# Patient Record
Sex: MALE | Race: BLACK OR AFRICAN AMERICAN | NOT HISPANIC OR LATINO
[De-identification: names, ages, dates, MRNs, and addresses within clinical notes are randomized per-mention and may not be internally consistent; named-entity substitution may affect disease eponyms.]

---

## 2022-10-20 PROBLEM — Z98.890 HISTORY OF ELBOW SURGERY: Status: RESOLVED | Noted: 2022-10-20 | Resolved: 2022-10-20

## 2022-10-20 PROBLEM — Z00.00 ENCOUNTER FOR PREVENTIVE HEALTH EXAMINATION: Status: ACTIVE | Noted: 2022-10-20

## 2022-10-20 PROBLEM — Z87.891 FORMER SMOKER: Status: ACTIVE | Noted: 2022-10-20

## 2022-10-20 RX ORDER — TIZANIDINE HYDROCHLORIDE 6 MG/1
CAPSULE ORAL
Refills: 0 | Status: ACTIVE | COMMUNITY

## 2022-10-20 RX ORDER — IBUPROFEN 800 MG/1
TABLET, FILM COATED ORAL
Refills: 0 | Status: ACTIVE | COMMUNITY

## 2022-10-24 ENCOUNTER — APPOINTMENT (OUTPATIENT)
Dept: NEUROSURGERY | Facility: CLINIC | Age: 52
End: 2022-10-24

## 2022-10-24 ENCOUNTER — NON-APPOINTMENT (OUTPATIENT)
Age: 52
End: 2022-10-24

## 2022-10-24 VITALS
WEIGHT: 194 LBS | OXYGEN SATURATION: 98 % | RESPIRATION RATE: 17 BRPM | BODY MASS INDEX: 30.45 KG/M2 | TEMPERATURE: 97.1 F | HEART RATE: 67 BPM | HEIGHT: 67 IN

## 2022-10-24 VITALS
BODY MASS INDEX: 21.62 KG/M2 | SYSTOLIC BLOOD PRESSURE: 88 MMHG | TEMPERATURE: 96.2 F | RESPIRATION RATE: 17 BRPM | HEIGHT: 69 IN | WEIGHT: 146 LBS | HEART RATE: 90 BPM | DIASTOLIC BLOOD PRESSURE: 63 MMHG | OXYGEN SATURATION: 99 %

## 2022-10-24 DIAGNOSIS — Z98.890 OTHER SPECIFIED POSTPROCEDURAL STATES: ICD-10-CM

## 2022-10-24 DIAGNOSIS — Z87.891 PERSONAL HISTORY OF NICOTINE DEPENDENCE: ICD-10-CM

## 2022-10-24 PROCEDURE — 99204 OFFICE O/P NEW MOD 45 MIN: CPT

## 2022-10-25 RX ORDER — EMTRICITABINE, RILPIVIRINE HYDROCHLORIDE, AND TENOFOVIR ALAFENAMIDE 200; 25; 25 MG/1; MG/1; MG/1
TABLET ORAL
Refills: 0 | Status: ACTIVE | COMMUNITY

## 2022-10-30 NOTE — HISTORY OF PRESENT ILLNESS
[de-identified] : 52 year old man with past medical history HIV who presents to the office today for second opinion for lumbar spine.\par \par Mr. Cosme states he has suffered from low back pain and sciatica for the past 3 years. He states it has become progressively worse over the past year. He reports severe low back pain radiating to bilateral hips and buttocks down legs to feet with associated numbness/tingling (RIGHT greater than LEFT). He reports subjective weakness of bilateral lower extremities. He has been under the care of pain management for the past 3 years and has had ESIs in the past. At first, he states he had relief from the ESIs for about 4-6 weeks but repeat ESIs are now ineffective. He has also failed oral pain medications prescribed by pain management.\par \par He has participated in supervised physical therapy with no improvement in symptoms.\par \par He had MRI lumbar spine done on 9/15/22 which he brings today for review.

## 2022-10-30 NOTE — ASSESSMENT
[FreeTextEntry1] : MRI lumbar spine without contrast done on 9/15/22 reviewed by Dr. Contreras with patient, demonstrates L3-4 severe left and moderate right foraminal stenosis, L4-5 severe left and right foraminal stenosis and L5-S1 disc herniation.\par The patient has exhausted conservative measures and failed supervised physical therapy and pain management.\par We discussed potential surgical interventions to address his symptoms including instrumented fusion.\par Prior to finalizing surgical planning, CT lumbar spine and flex/ext x-rays needed.\par Also recommend EMG of lower extremities to determine specific neurological levels of involvement to guide further treatment accordingly\par \par After ordered imaging studies complete, return to the office to review imaging with Dr. Contreras and discuss surgical final surgical plan.\par \par Patient verbalizes agreement and understanding with plan of care.\par \par I, Dr. Contreras, personally performed the evaluation and management (E/M) services for this new patient.  That E/M includes conducting the initial examination, assessing all conditions, and establishing the plan of care.  Today, my ACP, Anum Reed, was here to observe my evaluation and management services for this patient to be followed going forward.\par \par

## 2022-10-30 NOTE — PHYSICAL EXAM
[General Appearance - Alert] : alert [General Appearance - In No Acute Distress] : in no acute distress [Oriented To Time, Place, And Person] : oriented to person, place, and time [Straight-Leg Raise Test - Left] : straight leg raise of the left leg was positive [Straight-Leg Raise Test - Right] : straight leg raise of the right leg was positive [Pain] : was painful [Antalgic] : antalgic [Sclera] : the sclera and conjunctiva were normal [Outer Ear] : the ears and nose were normal in appearance [Neck Appearance] : the appearance of the neck was normal [] : no respiratory distress [Abnormal Walk] : normal gait [Able to toe walk] : the patient was not able to toe walk

## 2022-11-14 ENCOUNTER — TRANSCRIPTION ENCOUNTER (OUTPATIENT)
Age: 52
End: 2022-11-14

## 2022-11-18 ENCOUNTER — NON-APPOINTMENT (OUTPATIENT)
Age: 52
End: 2022-11-18

## 2022-11-18 ENCOUNTER — APPOINTMENT (OUTPATIENT)
Dept: NEUROSURGERY | Facility: CLINIC | Age: 52
End: 2022-11-18

## 2022-11-18 VITALS
HEART RATE: 80 BPM | OXYGEN SATURATION: 98 % | BODY MASS INDEX: 21.77 KG/M2 | DIASTOLIC BLOOD PRESSURE: 73 MMHG | SYSTOLIC BLOOD PRESSURE: 108 MMHG | TEMPERATURE: 97 F | HEIGHT: 69 IN | WEIGHT: 147 LBS | RESPIRATION RATE: 18 BRPM

## 2022-11-18 DIAGNOSIS — G95.19 OTHER VASCULAR MYELOPATHIES: ICD-10-CM

## 2022-11-18 DIAGNOSIS — Z01.818 ENCOUNTER FOR OTHER PREPROCEDURAL EXAMINATION: ICD-10-CM

## 2022-11-18 PROCEDURE — 99214 OFFICE O/P EST MOD 30 MIN: CPT

## 2022-11-19 PROBLEM — G95.19 NEUROGENIC CLAUDICATION: Status: ACTIVE | Noted: 2022-10-25

## 2022-11-20 NOTE — ADDENDUM
[FreeTextEntry1] : Patient has severe lumbar stenosis at L3-4 and L4-5. He does have a grade I spondylolisthesis at L3-4 abd CT scan was obtained which does not show a pars fracture and flex-ex does not suggest instability. He has failed conservative measures. Lumbar decompression with laminectomy at L3-4 and L4-5 may help with his claudication and leg weakness. Risks including but not limited to persistent symptoms, spinal destabilization, CSF leak, infection, need for re-operation discussed. Patient wishes to proceed with surgery.\par \par Byron Contreras M.D.

## 2022-11-20 NOTE — DATA REVIEWED
[de-identified] : 				\par Exam requested by:\par PIETRO ANTHONY MD\par [address], [place] [address]\par [facility] [address]\par SITE PERFORMED: [facility]\par SITE PHONE: [phone]\par Patient: TAQUERIA LI\par YOB: 1970\par Phone: [phone]\par MRN: [id number] Acc: [id number]\par Date of Exam: 11-\par  \par EXAM: CT LUMBAR SPINE WITHOUT CONTRAST\par \par Note - This patient has received 0 CT studies and 0 Myocardial Perfusion studies within our network over the previous 12 month period.\par \par HISTORY: Pain. Preop.\par \par TECHNIQUE: Non-contrast volumetric CT of the lumbar spine was performed. Multiplanar reformatted images were generated and submitted by the technologist. One or more of the following dose reduction techniques were used: automated exposure control, adjustment of the mA and/or kV according to patient size, use of iterative reconstruction technique.\par \par Note that CT without intrathecal contrast is limited in the assessment of spinal canal and neural foraminal contents/patency.\par \par COMPARISON: MRI of the lumbar spine dated 9/15/2022.\par \par FINDINGS: There is mild to moderate lumbar dextroscoliosis. There is straightening of the lumbar lordosis. There is grade 1 anterolisthesis of L3 on L4 and retrolisthesis of L5 on S1. There is moderate disc space narrowing at L3-4 and L5-S1 with degenerative endplate sclerotic/cystic change. Vertebral body heights, disc spaces, and alignment are otherwise maintained. There are no acute compression fractures or suspicious lytic or blastic osseous lesions.\par \par T12-L1: No significant spinal canal or neural foraminal compromise.\par \par L1-L2: No significant spinal canal or neural foraminal compromise.\par \par L2-L3: No significant spinal canal or neural foraminal compromise.\par \par L3-L4: There is grade 1 anterolisthesis, moderate diffuse disc bulging and moderate to severe facet arthrosis contributing to moderate to severe spinal canal stenosis and stenosis of the subarticular recesses bilaterally. There is moderate to severe left and moderate right foraminal stenosis.\par \par L4-L5: There is moderate diffuse disc bulge and facet arthrosis contributing to moderate spinal canal stenosis and subarticular recess stenosis bilaterally. There is moderate to severe foraminal stenosis bilaterally.\par \par L5-S1: There is moderate diffuse disc bulge asymmetric to the right, and mild to moderate facet arthrosis contributing to mild spinal canal stenosis and impingement of the right worse than left subarticular recesses. There is severe right and mild to moderate left foraminal stenosis.\par \par IMPRESSION:\par \par Multilevel degenerative changes of the lumbar spine superimposed on dextroscoliosis contributing to moderate to severe spinal canal stenosis at L3-4 and L4-5, mild at L5-S1.\par \par Moderate to severe lower lumbar foraminal stenoses, worst on the left at L3-4, bilaterally at L4-5 and on the right at L5-S1. Moderate lower lumbar facet arthrosis, worst at L3-4. Grade 1 anterolisthesis L3-4.\par \par Thank you for the opportunity to participate in the care of this patient.  \par  \par Magdiel Arce MD  - Electronically Signed: 11- 1:12 PM \par Physician to Physician Direct Line is: [phone]

## 2022-11-20 NOTE — HISTORY OF PRESENT ILLNESS
[de-identified] : 52 year old man hx HIV and lumbar stenosis who presented to the office today for second opinion for lumbar spine.\par \par Mr. Cosme states he has suffered from low back pain and sciatica for the past 3 years. He states it has become progressively worse over the past year. He reports severe low back pain radiating to bilateral hips and buttocks down legs to feet with associated numbness/tingling (RIGHT greater than LEFT). He reports subjective weakness of bilateral lower extremities. He has been under the care of pain management for the past 3 years and has had ESIs in the past. At first, he states he had relief from the ESIs for about 4-6 weeks but repeat ESIs done this year have been ineffective in alleviating pain. He has also failed trial of oral pain medications prescribed by pain management including ibuprofen, tramadol and muscle relaxers.\par \par He has participated in supervised physical therapy with no improvement in symptoms.\par \par He had MRI lumbar spine done on 9/15/22, which demonstrates L3-L4 severe left and moderate right foraminal stenosis, L4-5 severe left and right foraminal stenosis and L5-S1 disc herniation. He was recommended to obtain CT lumbar spine and EMG studies. He returns today to review CT lumbar spine.

## 2022-11-20 NOTE — ASSESSMENT
[FreeTextEntry1] : MRI lumbar spine without contrast done on 9/15/22 reviewed by Dr. Contreras with patient, demonstrates L3-4 severe left and moderate right foraminal stenosis, L4-5 severe left and right foraminal stenosis and L5-S1 disc herniation.\par CT lumbar spine done on 11/9/22 reviewed by Dr. Contreras with patient.\par The patient has exhausted conservative measures including supervised physical therapy and trial of oral pain medications as well as ESIs with pain management.\par We discussed potential surgical intervention, L3-4, L4-5 laminectomy..\par Risks, benefits and alternatives to surgery discussed. \par Multiple questions answered to patient’s satisfaction. \par Risks include but is not limited to infection, no resolution of symptoms\par Education provided regarding pre-operative clearance requirements\par PST packet reviewed with and given to patient\par Education provided regarding use of chlorhexidine wipes pre-op\par Needs COVID swab within 72 hours of surgery\par \par Patient and patient’s family understand and wish to proceed with planned surgery. \par \par I, Dr. Contreras, personally performed the evaluation and management (E/M) services for this established patient who presents today with (a) new problem(s)/exacerbation of (an) existing condition(s).  That E/M includes conducting the examination, assessing all new/exacerbated conditions, and establishing a new plan of care.  Today, my ACP, Anum Reed, was here to observe my evaluation and management services for this new problem/exacerbated condition to be followed going forward.\par \par

## 2022-12-19 RX ORDER — IBUPROFEN 800 MG/1
800 TABLET, FILM COATED ORAL 3 TIMES DAILY
Qty: 90 | Refills: 0 | Status: ACTIVE | COMMUNITY
Start: 2022-11-18 | End: 1900-01-01

## 2022-12-28 ENCOUNTER — INPATIENT (INPATIENT)
Facility: HOSPITAL | Age: 52
LOS: 3 days | Discharge: HOME | DRG: 516 | End: 2023-01-01
Attending: NEUROLOGICAL SURGERY | Admitting: NEUROLOGICAL SURGERY
Payer: COMMERCIAL

## 2022-12-28 ENCOUNTER — TRANSCRIPTION ENCOUNTER (OUTPATIENT)
Age: 52
End: 2022-12-28

## 2022-12-28 VITALS
TEMPERATURE: 98 F | RESPIRATION RATE: 18 BRPM | WEIGHT: 149.03 LBS | HEART RATE: 93 BPM | HEIGHT: 68 IN | DIASTOLIC BLOOD PRESSURE: 53 MMHG | SYSTOLIC BLOOD PRESSURE: 92 MMHG | OXYGEN SATURATION: 96 %

## 2022-12-28 DIAGNOSIS — Z87.39 PERSONAL HISTORY OF OTHER DISEASES OF THE MUSCULOSKELETAL SYSTEM AND CONNECTIVE TISSUE: ICD-10-CM

## 2022-12-28 DIAGNOSIS — B20 HUMAN IMMUNODEFICIENCY VIRUS [HIV] DISEASE: ICD-10-CM

## 2022-12-28 DIAGNOSIS — Z98.890 OTHER SPECIFIED POSTPROCEDURAL STATES: Chronic | ICD-10-CM

## 2022-12-28 LAB
ANION GAP SERPL CALC-SCNC: 6 MMOL/L — SIGNIFICANT CHANGE UP (ref 5–17)
APTT BLD: 29.7 SEC — SIGNIFICANT CHANGE UP (ref 27.5–35.5)
BASOPHILS # BLD AUTO: 0.03 K/UL — SIGNIFICANT CHANGE UP (ref 0–0.2)
BASOPHILS NFR BLD AUTO: 0.4 % — SIGNIFICANT CHANGE UP (ref 0–2)
BLD GP AB SCN SERPL QL: NEGATIVE — SIGNIFICANT CHANGE UP
BUN SERPL-MCNC: 18 MG/DL — SIGNIFICANT CHANGE UP (ref 7–23)
CALCIUM SERPL-MCNC: 9.3 MG/DL — SIGNIFICANT CHANGE UP (ref 8.4–10.5)
CHLORIDE SERPL-SCNC: 102 MMOL/L — SIGNIFICANT CHANGE UP (ref 96–108)
CK MB CFR SERPL CALC: 3.6 NG/ML — SIGNIFICANT CHANGE UP (ref 0–6.7)
CK SERPL-CCNC: 414 U/L — HIGH (ref 30–200)
CO2 SERPL-SCNC: 28 MMOL/L — SIGNIFICANT CHANGE UP (ref 22–31)
CREAT SERPL-MCNC: 0.95 MG/DL — SIGNIFICANT CHANGE UP (ref 0.5–1.3)
EGFR: 96 ML/MIN/1.73M2 — SIGNIFICANT CHANGE UP
EOSINOPHIL # BLD AUTO: 0.05 K/UL — SIGNIFICANT CHANGE UP (ref 0–0.5)
EOSINOPHIL NFR BLD AUTO: 0.6 % — SIGNIFICANT CHANGE UP (ref 0–6)
GLUCOSE SERPL-MCNC: 130 MG/DL — HIGH (ref 70–99)
HCT VFR BLD CALC: 38.7 % — LOW (ref 39–50)
HGB BLD-MCNC: 13.3 G/DL — SIGNIFICANT CHANGE UP (ref 13–17)
IMM GRANULOCYTES NFR BLD AUTO: 0.5 % — SIGNIFICANT CHANGE UP (ref 0–0.9)
INR BLD: 1.1 — SIGNIFICANT CHANGE UP (ref 0.88–1.16)
LYMPHOCYTES # BLD AUTO: 2.2 K/UL — SIGNIFICANT CHANGE UP (ref 1–3.3)
LYMPHOCYTES # BLD AUTO: 27.2 % — SIGNIFICANT CHANGE UP (ref 13–44)
MCHC RBC-ENTMCNC: 33.8 PG — SIGNIFICANT CHANGE UP (ref 27–34)
MCHC RBC-ENTMCNC: 34.4 GM/DL — SIGNIFICANT CHANGE UP (ref 32–36)
MCV RBC AUTO: 98.2 FL — SIGNIFICANT CHANGE UP (ref 80–100)
MONOCYTES # BLD AUTO: 0.51 K/UL — SIGNIFICANT CHANGE UP (ref 0–0.9)
MONOCYTES NFR BLD AUTO: 6.3 % — SIGNIFICANT CHANGE UP (ref 2–14)
NEUTROPHILS # BLD AUTO: 5.25 K/UL — SIGNIFICANT CHANGE UP (ref 1.8–7.4)
NEUTROPHILS NFR BLD AUTO: 65 % — SIGNIFICANT CHANGE UP (ref 43–77)
NRBC # BLD: 0 /100 WBCS — SIGNIFICANT CHANGE UP (ref 0–0)
PLATELET # BLD AUTO: 216 K/UL — SIGNIFICANT CHANGE UP (ref 150–400)
POTASSIUM SERPL-MCNC: 4.5 MMOL/L — SIGNIFICANT CHANGE UP (ref 3.5–5.3)
POTASSIUM SERPL-SCNC: 4.5 MMOL/L — SIGNIFICANT CHANGE UP (ref 3.5–5.3)
PROTHROM AB SERPL-ACNC: 13.1 SEC — SIGNIFICANT CHANGE UP (ref 10.5–13.4)
RBC # BLD: 3.94 M/UL — LOW (ref 4.2–5.8)
RBC # FLD: 12 % — SIGNIFICANT CHANGE UP (ref 10.3–14.5)
RH IG SCN BLD-IMP: POSITIVE — SIGNIFICANT CHANGE UP
SARS-COV-2 RNA SPEC QL NAA+PROBE: NEGATIVE — SIGNIFICANT CHANGE UP
SODIUM SERPL-SCNC: 136 MMOL/L — SIGNIFICANT CHANGE UP (ref 135–145)
TROPONIN T SERPL-MCNC: 0.01 NG/ML — SIGNIFICANT CHANGE UP (ref 0–0.01)
WBC # BLD: 8.08 K/UL — SIGNIFICANT CHANGE UP (ref 3.8–10.5)
WBC # FLD AUTO: 8.08 K/UL — SIGNIFICANT CHANGE UP (ref 3.8–10.5)

## 2022-12-28 PROCEDURE — 93010 ELECTROCARDIOGRAM REPORT: CPT

## 2022-12-28 PROCEDURE — 99285 EMERGENCY DEPT VISIT HI MDM: CPT

## 2022-12-28 PROCEDURE — 71046 X-RAY EXAM CHEST 2 VIEWS: CPT | Mod: 26

## 2022-12-28 PROCEDURE — 99222 1ST HOSP IP/OBS MODERATE 55: CPT

## 2022-12-28 RX ORDER — APREPITANT 80 MG/1
40 CAPSULE ORAL ONCE
Refills: 0 | Status: COMPLETED | OUTPATIENT
Start: 2022-12-29 | End: 2022-12-29

## 2022-12-28 RX ORDER — DIAZEPAM 5 MG
5 TABLET ORAL ONCE
Refills: 0 | Status: DISCONTINUED | OUTPATIENT
Start: 2022-12-28 | End: 2022-12-28

## 2022-12-28 RX ORDER — HYDROMORPHONE HYDROCHLORIDE 2 MG/ML
1 INJECTION INTRAMUSCULAR; INTRAVENOUS; SUBCUTANEOUS ONCE
Refills: 0 | Status: DISCONTINUED | OUTPATIENT
Start: 2022-12-28 | End: 2022-12-28

## 2022-12-28 RX ORDER — EMTRICITABINE, RILPIVIRINE HYDROCHLORIDE, AND TENOFOVIR DISOPROXIL FUMARATE 200; 25; 300 MG/1; MG/1; MG/1
1 TABLET, FILM COATED ORAL DAILY
Refills: 0 | Status: DISCONTINUED | OUTPATIENT
Start: 2022-12-29 | End: 2022-12-29

## 2022-12-28 RX ORDER — CELECOXIB 200 MG/1
200 CAPSULE ORAL ONCE
Refills: 0 | Status: ACTIVE | OUTPATIENT
Start: 2022-12-29 | End: 2022-12-29

## 2022-12-28 RX ORDER — OXYCODONE HYDROCHLORIDE 5 MG/1
5 TABLET ORAL EVERY 4 HOURS
Refills: 0 | Status: DISCONTINUED | OUTPATIENT
Start: 2022-12-28 | End: 2022-12-29

## 2022-12-28 RX ORDER — CHLORHEXIDINE GLUCONATE 213 G/1000ML
1 SOLUTION TOPICAL EVERY 12 HOURS
Refills: 0 | Status: DISCONTINUED | OUTPATIENT
Start: 2022-12-28 | End: 2022-12-29

## 2022-12-28 RX ORDER — SODIUM CHLORIDE 9 MG/ML
1000 INJECTION INTRAMUSCULAR; INTRAVENOUS; SUBCUTANEOUS
Refills: 0 | Status: DISCONTINUED | OUTPATIENT
Start: 2022-12-28 | End: 2022-12-29

## 2022-12-28 RX ORDER — ACETAMINOPHEN 500 MG
1000 TABLET ORAL EVERY 8 HOURS
Refills: 0 | Status: DISCONTINUED | OUTPATIENT
Start: 2022-12-28 | End: 2022-12-29

## 2022-12-28 RX ORDER — POVIDONE-IODINE 5 %
1 AEROSOL (ML) TOPICAL ONCE
Refills: 0 | Status: DISCONTINUED | OUTPATIENT
Start: 2022-12-29 | End: 2022-12-29

## 2022-12-28 RX ORDER — HYDROMORPHONE HYDROCHLORIDE 2 MG/ML
0.5 INJECTION INTRAMUSCULAR; INTRAVENOUS; SUBCUTANEOUS ONCE
Refills: 0 | Status: COMPLETED | OUTPATIENT
Start: 2022-12-28 | End: 2022-12-28

## 2022-12-28 RX ORDER — SENNA PLUS 8.6 MG/1
2 TABLET ORAL AT BEDTIME
Refills: 0 | Status: DISCONTINUED | OUTPATIENT
Start: 2022-12-28 | End: 2022-12-29

## 2022-12-28 RX ORDER — GABAPENTIN 400 MG/1
800 CAPSULE ORAL THREE TIMES A DAY
Refills: 0 | Status: DISCONTINUED | OUTPATIENT
Start: 2022-12-28 | End: 2022-12-29

## 2022-12-28 RX ORDER — ONDANSETRON 8 MG/1
4 TABLET, FILM COATED ORAL EVERY 6 HOURS
Refills: 0 | Status: DISCONTINUED | OUTPATIENT
Start: 2022-12-28 | End: 2022-12-29

## 2022-12-28 RX ADMIN — GABAPENTIN 800 MILLIGRAM(S): 400 CAPSULE ORAL at 22:52

## 2022-12-28 RX ADMIN — SENNA PLUS 2 TABLET(S): 8.6 TABLET ORAL at 21:48

## 2022-12-28 RX ADMIN — HYDROMORPHONE HYDROCHLORIDE 1 MILLIGRAM(S): 2 INJECTION INTRAMUSCULAR; INTRAVENOUS; SUBCUTANEOUS at 20:56

## 2022-12-28 RX ADMIN — ONDANSETRON 4 MILLIGRAM(S): 8 TABLET, FILM COATED ORAL at 23:22

## 2022-12-28 RX ADMIN — Medication 5 MILLIGRAM(S): at 23:23

## 2022-12-28 RX ADMIN — Medication 5 MILLIGRAM(S): at 21:48

## 2022-12-28 RX ADMIN — Medication 1000 MILLIGRAM(S): at 21:49

## 2022-12-28 RX ADMIN — HYDROMORPHONE HYDROCHLORIDE 1 MILLIGRAM(S): 2 INJECTION INTRAMUSCULAR; INTRAVENOUS; SUBCUTANEOUS at 20:41

## 2022-12-28 NOTE — PROGRESS NOTE ADULT - SUBJECTIVE AND OBJECTIVE BOX
Surgery: L3-L5 foraminotomy, laminectomy, facetectomy  Consent: Signed by patient                    NAME/NUMBER of HCP: Tana Hawthorne (brother) [phone]    Representative Consent: [ x ] Signed by patient vs HCP                                                 [  ] N/A -> only for cerebral angiogram    No Known Allergies      OVERNIGHT EVENTS: ASHWIN    T(C): 36.7 (22 @ 19:20), Max: 36.7 (22 @ 16:38)  HR: 93 (22 @ 19:20) (88 - 93)  BP: 107/71 (22 @ 19:20) (92/53 - 107/71)  RR: 17 (22 @ 19:20) (17 - 18)  SpO2: 96% (22 @ 19:20) (96% - 96%)  Wt(kg): --    EXAM:  GEN: laying in bed, appears well, NAD  NEURO: AOx3. FC, OE spont, speech intact, face symmetric. CNII-XII intact. PERRL, EOMI. No pronator drift. MAEx4. 5/5 strength throughout. SILT. midline tenderness over lumbar spine  CV: RRR +S1/S2  PULM: CTAB  GI: Abd soft, NT/ND  EXT: ext warm, dry, nontender        136  |  102  |  18  ----------------------------<  130<H>  4.5   |  28  |  0.95    Ca    9.3      28 Dec 2022 17:20      CBC Full  -  ( 28 Dec 2022 17:20 )  WBC Count : 8.08 K/uL  RBC Count : 3.94 M/uL  Hemoglobin : 13.3 g/dL  Hematocrit : 38.7 %  Platelet Count - Automated : 216 K/uL  Mean Cell Volume : 98.2 fl  Mean Cell Hemoglobin : 33.8 pg  Mean Cell Hemoglobin Concentration : 34.4 gm/dL  Auto Neutrophil # : 5.25 K/uL  Auto Lymphocyte # : 2.20 K/uL  Auto Monocyte # : 0.51 K/uL  Auto Eosinophil # : 0.05 K/uL  Auto Basophil # : 0.03 K/uL  Auto Neutrophil % : 65.0 %  Auto Lymphocyte % : 27.2 %  Auto Monocyte % : 6.3 %  Auto Eosinophil % : 0.6 %  Auto Basophil % : 0.4 %    PT/INR - ( 28 Dec 2022 17:20 )   PT: 13.1 sec;   INR: 1.10          PTT - ( 28 Dec 2022 17:20 )  PTT:29.7 sec    Pregnancy test:  Type & Screen (in past 72hrs):     2 Type & Screen within 72 hours if anticipate blood need in OR:  x Y _ N     Blood ordered and on hold for OR:   [ ] No need     [ ] 1u pRBC on hold      [x ] 2u pRBC on hold    COVID swab (in past 48hrs): x Y  _N    CXR:   EK/28  ECHO:  Medical Clearances: pending  Other Clearances:     Last dose of antiplatelet/anticoagulation drug: N/A    Implanted Devices (pacemaker, drug pump...etc):  []YES   [] NO                  If yes --> EPS consulted to interrogate device: [ ] YES  [ ] NO                            If yes -->  EPS called to let them know patient going for surgery: [ ] device needs to be turned off                                                                                                                                                 [ ] magnet needs to be placed for surgery                                                                                                                                                [ ] nothing to do per EP, may proceed with bovie use in OR                                       3M nasal swab ordered?  xY  _N    Cranial surgery: Order written for hair to be shampooed night before surgery and morning before surgery  [] yes   []no  Chlorhexidine Wipes ordered for Neck Down?  x Y  _ N  (twice a day if 1 day before surgery, daily for 3 days if 3 days prior, daily if in ICU)                 Assessment: 53 yo M PMH HIV (on HAART, undetectable VL) here with LBP, preop L3-5 foraminotomy, facetectomy, laminectomy      Plan:  NEURO:   - neuro checks q4h  - pain control   - med clearance pending    CARDIOVASCULAR:   - normotensive sbp goal   - EKG    PULMONARY:   - IS  - satting well RA    GI:   - NPO after midnight for OR  - bowel regimen    RENAL:   - voiding    HEME:   - h/h stable  - SCDs    ID:   - afebrile  - cont home odefsey for HIV    ENDO:   - glucose goal 140-180    DISPO: regional, full code, dispo pending OR    D/w Dr. Contreras    Assessment:  Present when checked    []  GCS  E   V  M     Heart Failure: []Acute, [] acute on chronic , []chronic  Heart Failure:  [] Diastolic (HFpEF), [] Systolic (HFrEF), []Combined (HFpEF and HFrEF), [] RHF, [] Pulm HTN, [] Other    [] ALAINA, [] ATN, [] AIN, [] other  [] CKD1, [] CKD2, [] CKD 3, [] CKD 4, [] CKD 5, []ESRD    Encephalopathy: [] Metabolic, [] Hepatic, [] toxic, [] Neurological, [] Other    Abnormal Nurtitional Status: [] malnurtition (see nutrition note), [ ]underweight: BMI < 19, [] morbid obesity: BMI >40, [] Cachexia    [] Sepsis  [] hypovolemic shock,[] cardiogenic shock, [] hemorrhagic shock, [] neuogenic shock  [] Acute Respiratory Failure  []Cerebral edema, [] Brain compression/ herniation,   [] Functional quadriplegia  [] Acute blood loss anemia   Surgery: L3-L5 foraminotomy, laminectomy, facetectomy  Consent: Signed by patient                    NAME/NUMBER of HCP: Tana Hawthorne (brother) [phone]    Representative Consent: [ x ] Signed by patient vs HCP                                                 [  ] N/A -> only for cerebral angiogram    No Known Allergies      OVERNIGHT EVENTS: ASHWIN    T(C): 36.7 (22 @ 19:20), Max: 36.7 (22 @ 16:38)  HR: 93 (22 @ 19:20) (88 - 93)  BP: 107/71 (22 @ 19:20) (92/53 - 107/71)  RR: 17 (22 @ 19:20) (17 - 18)  SpO2: 96% (22 @ 19:20) (96% - 96%)  Wt(kg): --    EXAM:  GEN: laying in bed, appears well, NAD  NEURO: AOx3. FC, OE spont, speech intact, face symmetric. CNII-XII intact. PERRL, EOMI. No pronator drift. MAEx4. 5/5 strength throughout. SILT. midline tenderness over lumbar spine  CV: RRR +S1/S2  PULM: CTAB  GI: Abd soft, NT/ND  EXT: ext warm, dry, nontender        136  |  102  |  18  ----------------------------<  130<H>  4.5   |  28  |  0.95    Ca    9.3      28 Dec 2022 17:20      CBC Full  -  ( 28 Dec 2022 17:20 )  WBC Count : 8.08 K/uL  RBC Count : 3.94 M/uL  Hemoglobin : 13.3 g/dL  Hematocrit : 38.7 %  Platelet Count - Automated : 216 K/uL  Mean Cell Volume : 98.2 fl  Mean Cell Hemoglobin : 33.8 pg  Mean Cell Hemoglobin Concentration : 34.4 gm/dL  Auto Neutrophil # : 5.25 K/uL  Auto Lymphocyte # : 2.20 K/uL  Auto Monocyte # : 0.51 K/uL  Auto Eosinophil # : 0.05 K/uL  Auto Basophil # : 0.03 K/uL  Auto Neutrophil % : 65.0 %  Auto Lymphocyte % : 27.2 %  Auto Monocyte % : 6.3 %  Auto Eosinophil % : 0.6 %  Auto Basophil % : 0.4 %    PT/INR - ( 28 Dec 2022 17:20 )   PT: 13.1 sec;   INR: 1.10          PTT - ( 28 Dec 2022 17:20 )  PTT:29.7 sec    Pregnancy test:  Type & Screen (in past 72hrs):     2 Type & Screen within 72 hours if anticipate blood need in OR:  x Y _ N     Blood ordered and on hold for OR:   [ ] No need     [ ] 1u pRBC on hold      [x ] 2u pRBC on hold    COVID swab (in past 48hrs): x Y  _N    CXR:   EK/28  ECHO:  Medical Clearances: cleared  Dr Graff  Other Clearances:     Last dose of antiplatelet/anticoagulation drug: N/A    Implanted Devices (pacemaker, drug pump...etc):  []YES   [] NO                  If yes --> EPS consulted to interrogate device: [ ] YES  [ ] NO                            If yes -->  EPS called to let them know patient going for surgery: [ ] device needs to be turned off                                                                                                                                                 [ ] magnet needs to be placed for surgery                                                                                                                                                [ ] nothing to do per EP, may proceed with bovie use in OR                                       3M nasal swab ordered?  xY  _N    Cranial surgery: Order written for hair to be shampooed night before surgery and morning before surgery  [] yes   []no  Chlorhexidine Wipes ordered for Neck Down?  x Y  _ N  (twice a day if 1 day before surgery, daily for 3 days if 3 days prior, daily if in ICU)                 Assessment: 53 yo M PMH HIV (on HAART, undetectable VL) here with LBP, preop L3-5 foraminotomy, facetectomy, laminectomy      Plan:  NEURO:   - neuro checks q4h  - pain control   - med clearance pending    CARDIOVASCULAR:   - normotensive sbp goal   - EKG    PULMONARY:   - IS  - satting well RA    GI:   - NPO after midnight for OR  - bowel regimen    RENAL:   - voiding    HEME:   - h/h stable  - SCDs    ID:   - afebrile  - cont home odefsey for HIV    ENDO:   - glucose goal 140-180    DISPO: regional, full code, dispo pending OR    D/w Dr. Contreras    Assessment:  Present when checked    []  GCS  E   V  M     Heart Failure: []Acute, [] acute on chronic , []chronic  Heart Failure:  [] Diastolic (HFpEF), [] Systolic (HFrEF), []Combined (HFpEF and HFrEF), [] RHF, [] Pulm HTN, [] Other    [] ALAINA, [] ATN, [] AIN, [] other  [] CKD1, [] CKD2, [] CKD 3, [] CKD 4, [] CKD 5, []ESRD    Encephalopathy: [] Metabolic, [] Hepatic, [] toxic, [] Neurological, [] Other    Abnormal Nurtitional Status: [] malnurtition (see nutrition note), [ ]underweight: BMI < 19, [] morbid obesity: BMI >40, [] Cachexia    [] Sepsis  [] hypovolemic shock,[] cardiogenic shock, [] hemorrhagic shock, [] neuogenic shock  [] Acute Respiratory Failure  []Cerebral edema, [] Brain compression/ herniation,   [] Functional quadriplegia  [] Acute blood loss anemia

## 2022-12-28 NOTE — H&P ADULT - PROBLEM SELECTOR PLAN 2
pre op for surgery tomorrow. Pre-op, will need lumbar decompression due acutely worsening symptoms of cauda equina nerve compression

## 2022-12-28 NOTE — PATIENT PROFILE ADULT - FALL HARM RISK - HARM RISK INTERVENTIONS

## 2022-12-28 NOTE — ED PROVIDER NOTE - CLINICAL SUMMARY MEDICAL DECISION MAKING FREE TEXT BOX
progressive back pain with leg numbness/weakness. sent for admit / OR by neurosurgery. preop workup obtained, NS consulted. admit for further management.

## 2022-12-28 NOTE — H&P ADULT - HISTORY OF PRESENT ILLNESS
51 yo male with history of hiv on haart undetectable vl, here with back pain. Reports progressive pain lower back, radiating down both legs, more on right, x several years. Gradually worsening. Associated with numbness and feeling of pressure, more on right. Denies trauma. Reports he has spinal stenosis, disc herniation and was referred by his neurosurgeon for surgery.  Patient admits to severe back pain that radiates primarily to RLE. Pain begins at midline lower back travels to the buttocks, R. thigh circumferentially and down to the toes.  He got multiple spinal injections int he past  with mediocre results.  He is on the schedule for surgry tomorrow.: L3-4, L4-5 laminectomy, foraminotomy facetectomy.     53 yo male with history of hiv on haart undetectable vl, here with back pain. Reports progressive pain lower back, radiating down both legs, more on right, x several years but now has acute severe worsening. Associated with numbness and feeling of pressure, more on right. Denies trauma. Patient has severe lumbar spinal stenosis with cauda equina compression, and disc herniations.  Patient admits to severe back pain that radiates primarily to RLE. Pain begins at midline lower back travels to the buttocks, R. thigh circumferentially and down to the toes.  He got multiple spinal injections int he past  with mediocre results.  He is on the schedule for surgry tomorrow.: L3-4, L4-5 laminectomy, foraminotomy facetectomy.

## 2022-12-28 NOTE — H&P ADULT - NSHPPHYSICALEXAM_GEN_ALL_CORE
A&O x 3   PRRl, EOMI, CN II to XII are grossly intact  Lung: Clear lung sound  heart: S1S2 regular  Abd: Soft non tender, +BS  Ext: 2+ distal pulses   No edema  Motor: No focal motor deficit. 5/5 x 4  No sensory deficit to touch.

## 2022-12-28 NOTE — CONSULT NOTE ADULT - ATTENDING COMMENTS
Pt shows early repolirsation in inferior leads, trop neg. Pt denies chest pain and METS>4  Pt is at low risk for intermediate risk procedure

## 2022-12-28 NOTE — ED ADULT NURSE NOTE - OBJECTIVE STATEMENT
52y male hx of HIV c/o back pain x several years. rpts pain and numbness to bilateral legs, primarily R leg. states that he is scheduled to have surgery tomorrow and was sent in for pre op admission. Patient is awake and alert. Alert and oriented x 4. ambulating w/ steady gait. denies f/c, CP, SOB, n/v/d, HA. No acute distress noted at this time.

## 2022-12-28 NOTE — CONSULT NOTE ADULT - ASSESSMENT
52 male with PMHx of HIV and severe lumbar spinal stenosis admitted for  L3-4, L4-5 laminectomy, foraminotomy facetectomy on 12/29. Medicine consulted for pre-operative optimization.     #Pre-Op Evaluation  Pt undergoing  L3-4, L4-5 laminectomy, foraminotomy facetectomy for lumbar stenosis with Dr. Contreras on 12/29/2022. No cardiac or pulmonary hx. No adverse reactions to anesthesia in the past. Daily marijuana use, no current tobacco or alcohol use. Pt denies SOB with ambulation however mobility limited 2/2 lower back pain. METs >4.  ROS Negative for chest pain, palpitations, cough. RCRI 0. Mota 0. ECG NSR.   - patient low risk for intermediate risk procedure      #HIV  Hx of HIV. VLUD per patient. Compliant with medications -- on Odefsey 100/25/25mg once daily.   - continue with medications     **Final recommendations pending attending attestation**   52 male with PMHx of HIV and severe lumbar spinal stenosis admitted for  L3-4, L4-5 laminectomy, foraminotomy facetectomy on 12/29. Medicine consulted for pre-operative optimization.     #Pre-Op Evaluation  Pt undergoing  L3-4, L4-5 laminectomy, foraminotomy facetectomy for lumbar stenosis with Dr. Contreras on 12/29/2022. No cardiac or pulmonary hx. No adverse reactions to anesthesia in the past. Daily marijuana use, no current tobacco or alcohol use. Pt denies SOB with ambulation however mobility limited 2/2 lower back pain. METs >4.  ROS Negative for chest pain, palpitations, cough. RCRI 0. Mota 0. ECG showing non-specific changes in inferior leads, no previous ECGs.   - obtain AM Pre-operative labs: CBC, BMP, Type + Screen, aPTT/PT/INR  - recommend repeat ECG and sending troponin values  - further evaluation pending     #HIV  Hx of HIV. VLUD per patient. Compliant with medications -- on Odefsey 100/25/25mg once daily.   - continue with medications     **Final recommendations pending attending attestation**   52 male with PMHx of HIV and severe lumbar spinal stenosis admitted for  L3-4, L4-5 laminectomy, foraminotomy facetectomy on 12/29. Medicine consulted for pre-operative optimization.     #Pre-Op Evaluation  Pt undergoing  L3-4, L4-5 laminectomy, foraminotomy facetectomy for lumbar stenosis with Dr. Contreras on 12/29/2022. No cardiac or pulmonary hx. No adverse reactions to anesthesia in the past. Daily marijuana use, no current tobacco or alcohol use. Pt denies SOB with ambulation however mobility limited 2/2 lower back pain. METs >4.  ROS Negative for chest pain, palpitations, cough. RCRI 0. Mota 0. ECG showing non-specific changes in inferior leads, no previous ECGs.   - obtain AM Pre-operative labs: CBC, BMP, Type + Screen, aPTT/PT/INR  - recommend repeat ECG in 4-6hrs and obtain troponins  - further evaluation pending     #HIV  Hx of HIV. VLUD per patient. Compliant with medications -- on Odefsey 100/25/25mg once daily.   - continue with medications     **Final recommendations pending attending attestation**   52 male with PMHx of HIV and severe lumbar spinal stenosis admitted for  L3-4, L4-5 laminectomy, foraminotomy facetectomy on 12/29. Medicine consulted for pre-operative optimization.     #Pre-Op Evaluation  Pt undergoing  L3-4, L4-5 laminectomy, foraminotomy facetectomy for lumbar stenosis with Dr. Contreras on 12/29/2022. No cardiac or pulmonary hx. No adverse reactions to anesthesia in the past. Daily marijuana use, no current tobacco or alcohol use. Pt denies SOB with ambulation however mobility limited 2/2 lower back pain. METs >4.  ROS Negative for chest pain, palpitations, cough. RCRI 0. Mota 0. ECG showing non-specific changes in inferior leads, no previous ECGs.   - obtain AM Pre-operative labs: CBC, BMP, Type + Screen, aPTT/PT/INR  - recommend repeat ECG in 4-6hrs and obtain troponins -- patient denying chest pain  - patient actively vomiting, consider administering anti-emetic   - further evaluation pending     #HIV  Hx of HIV. VLUD per patient. Compliant with medications -- on Odefsey 100/25/25mg once daily.   - continue with medications     **Final recommendations pending attending attestation**   52 male with PMHx of HIV and severe lumbar spinal stenosis admitted for  L3-4, L4-5 laminectomy, foraminotomy facetectomy on 12/29. Medicine consulted for pre-operative optimization.     #Pre-Op Evaluation  Pt undergoing  L3-4, L4-5 laminectomy, foraminotomy facetectomy for lumbar stenosis with Dr. Contreras on 12/29/2022. No cardiac or pulmonary hx. No adverse reactions to anesthesia in the past. Daily marijuana use, no current tobacco or alcohol use. Pt denies SOB with ambulation however mobility limited 2/2 lower back pain. METs >4.  ROS Negative for chest pain, palpitations, cough. RCRI 0. Mota 0. ECG showing non-specific changes in inferior leads, no previous ECGs. Given lack of troponins and no symptoms, ECG changes likely 2/2 early depolarization seen in  population.    - obtain AM Pre-operative labs: CBC, BMP, Type + Screen, aPTT/PT/INR  - recommend repeat ECG in 4-6hrs -- patient denying chest pain  - patient actively vomiting, consider administering anti-emetic   - patient low risk for intermediate risk procedure     #HIV  Hx of HIV. VLUD per patient. Compliant with medications -- on Odefsey 100/25/25mg once daily.   - continue with medications     **Final recommendations pending attending attestation**

## 2022-12-28 NOTE — H&P ADULT - ASSESSMENT
52 male with severe lumbar spinal stenosis going for decompression laminectomy tomorrow. 52 male with severe lumbar spinal stenosis who will need decompression laminectomy due to worsening neurological function

## 2022-12-28 NOTE — ED PROVIDER NOTE - NEUROLOGICAL, MLM
Alert and oriented, no focal deficits, reports decreased sensation light touch right lower leg strength 4/5 right/ left extensors.

## 2022-12-28 NOTE — ED PROVIDER NOTE - OBJECTIVE STATEMENT
history of hiv on haart undetectable vl, here with back pain. Reports progressive pain lower back, radiating down both legs, more on right, x several years. Gradually worsening. Associated with numbness and feeling of pressure, more on right. Denies trauma. Reports he has spinal stenosis, disc herniation and was referred by his neurosurgeon for surgery

## 2022-12-28 NOTE — CONSULT NOTE ADULT - SUBJECTIVE AND OBJECTIVE BOX
TAQUERIA LI, 52y, Male  MRN-[id number]  Patient is a 52y old  Male who presents with a chief complaint of lumbar stenosis for surgery on 12/29/2022. (28 Dec 2022 18:13)    HPI: 53 yo male with history of hiv on haart undetectable vl, here with back pain. Reports progressive pain lower back, radiating down both legs, more on right, x several years. Gradually worsening. Associated with numbness and feeling of pressure, more on right. Denies trauma. Reports he has spinal stenosis, disc herniation and was referred by his neurosurgeon for surgery.  Patient admits to severe back pain that radiates primarily to RLE. Pain begins at midline lower back travels to the buttocks, R. thigh circumferentially and down to the toes.  He got multiple spinal injections int he past  with mediocre results.  He is on the schedule for surgery tomorrow.: L3-4, L4-5 laminectomy, foraminotomy facetectomy.    SUBJECTIVE: Pt seen/examined at bedside. PT endorsing he has had chronic pain in his lower back associated with his hx of lumbar stenosis. Medication hx: Gabapentin 800mg TID + Odefsy 200/25/25mg once daily. Pt denies cardiac history and/or pulmonary hx. Pt denies alcohol use, endorses daily marijuana use since 2013, denies other substance abuse. Pt endorses remote occasional tobacco use history many years ago but otherwise denies active tobacco use. Past surgical hx includes buttock abscess drainage and R elbow fracture reduction -- pt endorses both procedures done under general anesthesia. Pt denying hx of allergies to medications and denies adverse reactions to anesthesia. Pt denies chest pain, SOB, abdominal pain, nausea/vomiting, cough, palpitations.     12 Point ROS Negative unless noted otherwise above.  -------------------------------------------------------------------------------  VITAL SIGNS:  Vital Signs Last 24 Hrs  T(C): 36.7 (28 Dec 2022 19:20), Max: 36.7 (28 Dec 2022 16:38)  T(F): 98 (28 Dec 2022 19:20), Max: 98 (28 Dec 2022 16:38)  HR: 93 (28 Dec 2022 19:20) (88 - 93)  BP: 107/71 (28 Dec 2022 19:20) (92/53 - 107/71)  BP(mean): --  RR: 17 (28 Dec 2022 19:20) (17 - 18)  SpO2: 96% (28 Dec 2022 19:20) (96% - 96%)    Parameters below as of 28 Dec 2022 19:20  Patient On (Oxygen Delivery Method): room air      I&O's Summary      PHYSICAL EXAM:    General: NAD; laying in bed; speaking in full sentences   HEENT: NC/AT; EOMI;  moist mucosal membranes.  Neck: supple, trachea midline  Cardiovascular: RRR, +S1/S2; NO M/R/G  Respiratory: CTA B/L; no W/R/R  Gastrointestinal: soft, NT/ND; +BSx4  Extremities: WWP; no edema or cyanosis  Vascular: 2+ radial, DP/PT pulses B/L  Neurological: AAOx3; no focal deficits    ALLERGIES:  Allergies    No Known Allergies    Intolerances      MEDICATIONS:  MEDICATIONS  (STANDING):  acetaminophen     Tablet .. 1000 milliGRAM(s) Oral every 8 hours  chlorhexidine 2% Cloths 1 Application(s) Topical every 12 hours  diazepam    Tablet 5 milliGRAM(s) Oral once  gabapentin 800 milliGRAM(s) Oral three times a day  HYDROmorphone  Injectable 0.5 milliGRAM(s) IV Push once  senna 2 Tablet(s) Oral at bedtime  sodium chloride 0.9%. 1000 milliLiter(s) (70 mL/Hr) IV Continuous <Continuous>    MEDICATIONS  (PRN):  bisacodyl 5 milliGRAM(s) Oral daily PRN Constipation  oxyCODONE    IR 5 milliGRAM(s) Oral every 4 hours PRN Moderate Pain (4 - 6)  -------------------------------------------------------------------------------  LABS:                        13.3   8.08  )-----------( 216      ( 28 Dec 2022 17:20 )             38.7     12-28    136  |  102  |  18  ----------------------------<  130<H>  4.5   |  28  |  0.95    Ca    9.3      28 Dec 2022 17:20    PT/INR - ( 28 Dec 2022 17:20 )   PT: 13.1 sec;   INR: 1.10        PTT - ( 28 Dec 2022 17:20 )  PTT:29.7 sec    CAPILLARY BLOOD GLUCOSE    COVID-19 PCR: Negative (28 Dec 2022 17:20)    RADIOLOGY & ADDITIONAL TESTS: Reviewed.

## 2022-12-28 NOTE — PROGRESS NOTE ADULT - ASSESSMENT
Patient has severe lumbar stenosis at L3-4 and L4-5. He does have a grade I spondylolisthesis at L3-4 abd CT scan was obtained which does not show a pars fracture and flex-ex does not suggest instability. He has failed conservative measures. Lumbar decompression with laminectomy at L3-4 and L4-5 may help with his claudication and leg weakness. Risks including but not limited to persistent symptoms, spinal destabilization, CSF leak, infection, need for re-operation discussed. Patient wishes to proceed with surgery.    Byron Contreras M.D.

## 2022-12-29 ENCOUNTER — APPOINTMENT (OUTPATIENT)
Dept: NEUROSURGERY | Facility: HOSPITAL | Age: 52
End: 2022-12-29

## 2022-12-29 ENCOUNTER — TRANSCRIPTION ENCOUNTER (OUTPATIENT)
Age: 52
End: 2022-12-29

## 2022-12-29 LAB
ANION GAP SERPL CALC-SCNC: 8 MMOL/L — SIGNIFICANT CHANGE UP (ref 5–17)
BLD GP AB SCN SERPL QL: NEGATIVE — SIGNIFICANT CHANGE UP
BUN SERPL-MCNC: 20 MG/DL — SIGNIFICANT CHANGE UP (ref 7–23)
CALCIUM SERPL-MCNC: 9.3 MG/DL — SIGNIFICANT CHANGE UP (ref 8.4–10.5)
CHLORIDE SERPL-SCNC: 103 MMOL/L — SIGNIFICANT CHANGE UP (ref 96–108)
CO2 SERPL-SCNC: 27 MMOL/L — SIGNIFICANT CHANGE UP (ref 22–31)
CREAT SERPL-MCNC: 0.89 MG/DL — SIGNIFICANT CHANGE UP (ref 0.5–1.3)
EGFR: 103 ML/MIN/1.73M2 — SIGNIFICANT CHANGE UP
GLUCOSE SERPL-MCNC: 105 MG/DL — HIGH (ref 70–99)
HCT VFR BLD CALC: 38.3 % — LOW (ref 39–50)
HGB BLD-MCNC: 13.1 G/DL — SIGNIFICANT CHANGE UP (ref 13–17)
MAGNESIUM SERPL-MCNC: 2.1 MG/DL — SIGNIFICANT CHANGE UP (ref 1.6–2.6)
MCHC RBC-ENTMCNC: 34.2 GM/DL — SIGNIFICANT CHANGE UP (ref 32–36)
MCHC RBC-ENTMCNC: 34.3 PG — HIGH (ref 27–34)
MCV RBC AUTO: 100.3 FL — HIGH (ref 80–100)
NRBC # BLD: 0 /100 WBCS — SIGNIFICANT CHANGE UP (ref 0–0)
PHOSPHATE SERPL-MCNC: 4 MG/DL — SIGNIFICANT CHANGE UP (ref 2.5–4.5)
PLATELET # BLD AUTO: 201 K/UL — SIGNIFICANT CHANGE UP (ref 150–400)
POTASSIUM SERPL-MCNC: 4.6 MMOL/L — SIGNIFICANT CHANGE UP (ref 3.5–5.3)
POTASSIUM SERPL-SCNC: 4.6 MMOL/L — SIGNIFICANT CHANGE UP (ref 3.5–5.3)
RBC # BLD: 3.82 M/UL — LOW (ref 4.2–5.8)
RBC # FLD: 12.3 % — SIGNIFICANT CHANGE UP (ref 10.3–14.5)
RH IG SCN BLD-IMP: POSITIVE — SIGNIFICANT CHANGE UP
SODIUM SERPL-SCNC: 138 MMOL/L — SIGNIFICANT CHANGE UP (ref 135–145)
WBC # BLD: 5.47 K/UL — SIGNIFICANT CHANGE UP (ref 3.8–10.5)
WBC # FLD AUTO: 5.47 K/UL — SIGNIFICANT CHANGE UP (ref 3.8–10.5)

## 2022-12-29 PROCEDURE — 63047 LAM FACETEC & FORAMOT LUMBAR: CPT

## 2022-12-29 PROCEDURE — 63048 LAM FACETEC &FORAMOT EA ADDL: CPT

## 2022-12-29 RX ORDER — DEXAMETHASONE 0.5 MG/5ML
1 ELIXIR ORAL EVERY 8 HOURS
Refills: 0 | Status: DISCONTINUED | OUTPATIENT
Start: 2022-12-31 | End: 2023-01-01

## 2022-12-29 RX ORDER — ONDANSETRON 8 MG/1
4 TABLET, FILM COATED ORAL EVERY 6 HOURS
Refills: 0 | Status: DISCONTINUED | OUTPATIENT
Start: 2022-12-29 | End: 2022-12-31

## 2022-12-29 RX ORDER — ACETAMINOPHEN 500 MG
1000 TABLET ORAL EVERY 8 HOURS
Refills: 0 | Status: DISCONTINUED | OUTPATIENT
Start: 2022-12-29 | End: 2023-01-01

## 2022-12-29 RX ORDER — SODIUM CHLORIDE 9 MG/ML
1000 INJECTION INTRAMUSCULAR; INTRAVENOUS; SUBCUTANEOUS
Refills: 0 | Status: ACTIVE | OUTPATIENT
Start: 2022-12-29 | End: 2023-11-27

## 2022-12-29 RX ORDER — POLYETHYLENE GLYCOL 3350 17 G/17G
17 POWDER, FOR SOLUTION ORAL DAILY
Refills: 0 | Status: DISCONTINUED | OUTPATIENT
Start: 2022-12-29 | End: 2023-01-01

## 2022-12-29 RX ORDER — CEFAZOLIN SODIUM 1 G
2000 VIAL (EA) INJECTION EVERY 8 HOURS
Refills: 0 | Status: COMPLETED | OUTPATIENT
Start: 2022-12-29 | End: 2022-12-30

## 2022-12-29 RX ORDER — OXYCODONE HYDROCHLORIDE 5 MG/1
10 TABLET ORAL EVERY 6 HOURS
Refills: 0 | Status: ACTIVE | OUTPATIENT
Start: 2022-12-29 | End: 2023-01-05

## 2022-12-29 RX ORDER — ACETAMINOPHEN 500 MG
1000 TABLET ORAL ONCE
Refills: 0 | Status: COMPLETED | OUTPATIENT
Start: 2022-12-29 | End: 2022-12-29

## 2022-12-29 RX ORDER — SENNA PLUS 8.6 MG/1
2 TABLET ORAL AT BEDTIME
Refills: 0 | Status: DISCONTINUED | OUTPATIENT
Start: 2022-12-29 | End: 2023-01-01

## 2022-12-29 RX ORDER — DIAZEPAM 5 MG
5 TABLET ORAL EVERY 8 HOURS
Refills: 0 | Status: DISCONTINUED | OUTPATIENT
Start: 2022-12-29 | End: 2022-12-30

## 2022-12-29 RX ORDER — DEXAMETHASONE 0.5 MG/5ML
ELIXIR ORAL
Refills: 0 | Status: DISCONTINUED | OUTPATIENT
Start: 2022-12-29 | End: 2023-01-01

## 2022-12-29 RX ORDER — DEXAMETHASONE 0.5 MG/5ML
2 ELIXIR ORAL EVERY 8 HOURS
Refills: 0 | Status: COMPLETED | OUTPATIENT
Start: 2022-12-30 | End: 2022-12-31

## 2022-12-29 RX ORDER — EMTRICITABINE, RILPIVIRINE HYDROCHLORIDE, AND TENOFOVIR DISOPROXIL FUMARATE 200; 25; 300 MG/1; MG/1; MG/1
1 TABLET, FILM COATED ORAL DAILY
Refills: 0 | Status: DISCONTINUED | OUTPATIENT
Start: 2022-12-29 | End: 2023-01-01

## 2022-12-29 RX ORDER — OXYCODONE HYDROCHLORIDE 5 MG/1
10 TABLET ORAL EVERY 4 HOURS
Refills: 0 | Status: DISCONTINUED | OUTPATIENT
Start: 2022-12-29 | End: 2023-01-01

## 2022-12-29 RX ORDER — DEXAMETHASONE 0.5 MG/5ML
4 ELIXIR ORAL EVERY 8 HOURS
Refills: 0 | Status: COMPLETED | OUTPATIENT
Start: 2022-12-29 | End: 2022-12-30

## 2022-12-29 RX ORDER — PANTOPRAZOLE SODIUM 20 MG/1
40 TABLET, DELAYED RELEASE ORAL
Refills: 0 | Status: DISCONTINUED | OUTPATIENT
Start: 2022-12-29 | End: 2023-01-01

## 2022-12-29 RX ORDER — POLYETHYLENE GLYCOL 3350 17 G/17G
17 POWDER, FOR SOLUTION ORAL DAILY
Refills: 0 | Status: ACTIVE | OUTPATIENT
Start: 2022-12-29 | End: 2023-11-27

## 2022-12-29 RX ORDER — HYDROMORPHONE HYDROCHLORIDE 2 MG/ML
0.05 INJECTION INTRAMUSCULAR; INTRAVENOUS; SUBCUTANEOUS ONCE
Refills: 0 | Status: DISCONTINUED | OUTPATIENT
Start: 2022-12-29 | End: 2022-12-29

## 2022-12-29 RX ORDER — HYDROMORPHONE HYDROCHLORIDE 2 MG/ML
0.5 INJECTION INTRAMUSCULAR; INTRAVENOUS; SUBCUTANEOUS
Refills: 0 | Status: DISCONTINUED | OUTPATIENT
Start: 2022-12-29 | End: 2022-12-29

## 2022-12-29 RX ORDER — GABAPENTIN 400 MG/1
800 CAPSULE ORAL THREE TIMES A DAY
Refills: 0 | Status: DISCONTINUED | OUTPATIENT
Start: 2022-12-29 | End: 2023-01-01

## 2022-12-29 RX ORDER — HYDROMORPHONE HYDROCHLORIDE 2 MG/ML
0.5 INJECTION INTRAMUSCULAR; INTRAVENOUS; SUBCUTANEOUS ONCE
Refills: 0 | Status: DISCONTINUED | OUTPATIENT
Start: 2022-12-29 | End: 2022-12-29

## 2022-12-29 RX ORDER — OXYCODONE HYDROCHLORIDE 5 MG/1
5 TABLET ORAL EVERY 4 HOURS
Refills: 0 | Status: DISCONTINUED | OUTPATIENT
Start: 2022-12-29 | End: 2023-01-01

## 2022-12-29 RX ADMIN — GABAPENTIN 800 MILLIGRAM(S): 400 CAPSULE ORAL at 14:23

## 2022-12-29 RX ADMIN — OXYCODONE HYDROCHLORIDE 5 MILLIGRAM(S): 5 TABLET ORAL at 06:01

## 2022-12-29 RX ADMIN — Medication 100 MILLIGRAM(S): at 17:03

## 2022-12-29 RX ADMIN — OXYCODONE HYDROCHLORIDE 10 MILLIGRAM(S): 5 TABLET ORAL at 19:52

## 2022-12-29 RX ADMIN — HYDROMORPHONE HYDROCHLORIDE 0.5 MILLIGRAM(S): 2 INJECTION INTRAMUSCULAR; INTRAVENOUS; SUBCUTANEOUS at 13:00

## 2022-12-29 RX ADMIN — Medication 400 MILLIGRAM(S): at 13:23

## 2022-12-29 RX ADMIN — Medication 4 MILLIGRAM(S): at 21:35

## 2022-12-29 RX ADMIN — GABAPENTIN 800 MILLIGRAM(S): 400 CAPSULE ORAL at 21:34

## 2022-12-29 RX ADMIN — HYDROMORPHONE HYDROCHLORIDE 0.5 MILLIGRAM(S): 2 INJECTION INTRAMUSCULAR; INTRAVENOUS; SUBCUTANEOUS at 12:05

## 2022-12-29 RX ADMIN — EMTRICITABINE, RILPIVIRINE HYDROCHLORIDE, AND TENOFOVIR DISOPROXIL FUMARATE 1 TABLET(S): 200; 25; 300 TABLET, FILM COATED ORAL at 14:23

## 2022-12-29 RX ADMIN — Medication 1000 MILLIGRAM(S): at 22:28

## 2022-12-29 RX ADMIN — HYDROMORPHONE HYDROCHLORIDE 0.5 MILLIGRAM(S): 2 INJECTION INTRAMUSCULAR; INTRAVENOUS; SUBCUTANEOUS at 12:55

## 2022-12-29 RX ADMIN — Medication 5 MILLIGRAM(S): at 21:34

## 2022-12-29 RX ADMIN — GABAPENTIN 800 MILLIGRAM(S): 400 CAPSULE ORAL at 06:01

## 2022-12-29 RX ADMIN — Medication 1000 MILLIGRAM(S): at 06:00

## 2022-12-29 RX ADMIN — SODIUM CHLORIDE 65 MILLILITER(S): 9 INJECTION INTRAMUSCULAR; INTRAVENOUS; SUBCUTANEOUS at 11:50

## 2022-12-29 RX ADMIN — Medication 1000 MILLIGRAM(S): at 21:34

## 2022-12-29 RX ADMIN — HYDROMORPHONE HYDROCHLORIDE 0.5 MILLIGRAM(S): 2 INJECTION INTRAMUSCULAR; INTRAVENOUS; SUBCUTANEOUS at 14:22

## 2022-12-29 RX ADMIN — HYDROMORPHONE HYDROCHLORIDE 0.5 MILLIGRAM(S): 2 INJECTION INTRAMUSCULAR; INTRAVENOUS; SUBCUTANEOUS at 14:52

## 2022-12-29 RX ADMIN — Medication 1 TABLET(S): at 14:23

## 2022-12-29 RX ADMIN — APREPITANT 40 MILLIGRAM(S): 80 CAPSULE ORAL at 06:00

## 2022-12-29 RX ADMIN — HYDROMORPHONE HYDROCHLORIDE 0.5 MILLIGRAM(S): 2 INJECTION INTRAMUSCULAR; INTRAVENOUS; SUBCUTANEOUS at 11:49

## 2022-12-29 NOTE — BRIEF OPERATIVE NOTE - NSICDXBRIEFPROCEDURE_GEN_ALL_CORE_FT
PROCEDURES:  Lumbar laminectomy with foraminotomy 29-Dec-2022 08:33:57 L3-L5 laminectomy, facetectomy, foraminotomy Cecilia Polk

## 2022-12-29 NOTE — PHYSICAL THERAPY INITIAL EVALUATION ADULT - PERTINENT HX OF CURRENT PROBLEM, REHAB EVAL
51 yo male with history of hiv on haart undetectable vl, here with back pain. Reports progressive pain lower back, radiating down both legs, more on right, x several years but now has acute severe worsening. Associated with numbness and feeling of pressure, more on right. Denies trauma. Patient has severe lumbar spinal stenosis with cauda equina compression, and disc herniations.

## 2022-12-29 NOTE — BRIEF OPERATIVE NOTE - NSICDXBRIEFPOSTOP_GEN_ALL_CORE_FT
POST-OP DIAGNOSIS:  Lumbar stenosis with neurogenic claudication 29-Dec-2022 08:27:38  Cecilia Polk

## 2022-12-29 NOTE — PHYSICAL THERAPY INITIAL EVALUATION ADULT - GENERAL OBSERVATIONS, REHAB EVAL
Pt received semi supine, +lumbar incision bandage C/D/I, +hemovac, +heplock, +bilateral SCDs, NAD, agreeable to PT.

## 2022-12-29 NOTE — BRIEF OPERATIVE NOTE - NSICDXBRIEFPREOP_GEN_ALL_CORE_FT
PRE-OP DIAGNOSIS:  Lumbar stenosis with neurogenic claudication 29-Dec-2022 08:27:22  Cecilia Polk

## 2022-12-29 NOTE — PHYSICAL THERAPY INITIAL EVALUATION ADULT - LIGHT TOUCH SENSATION, RLE, REHAB EVAL
intact to light touch sensation testing, however reports "numbness" and 60% sensation in RLE in comparison to LLE

## 2022-12-29 NOTE — PHYSICAL THERAPY INITIAL EVALUATION ADULT - MANUAL MUSCLE TESTING RESULTS, REHAB EVAL
functional mobility testing; >/=3+/5 bilateral UE and LE (Bilateral DF and PF 5/5)/grossly assessed due to

## 2022-12-29 NOTE — PHYSICAL THERAPY INITIAL EVALUATION ADULT - ADDITIONAL COMMENTS
Pt lives alone in an elevator access apt. At baseline, ambulates independently with no DME, however reports that he was limited to 2 blocks due to "my legs would start to give out and I would need to sit." NO reports of falls in the past 6 months.

## 2022-12-29 NOTE — PHYSICAL THERAPY INITIAL EVALUATION ADULT - GAIT DEVIATIONS NOTED, PT EVAL
antalgic gait/decreased navin/decreased step length/decreased stride length/decreased weight-shifting ability

## 2022-12-29 NOTE — PROGRESS NOTE ADULT - SUBJECTIVE AND OBJECTIVE BOX
NEUROSURGERY POST OP NOTE:    POD# 0 S/P L3-5 laminectomies and foraminotomies     S: 53 yo male with history of hiv on haart undetectable vl, here with back pain. Reports progressive pain lower back, radiating down both legs, more on right, x several years but now has acute severe worsening. Associated with numbness and feeling of pressure, more on right. Denies trauma. Patient has severe lumbar spinal stenosis with cauda equina compression, and disc herniations.  Patient admits to severe back pain that radiates primarily to RLE. Pain begins at midline lower back travels to the buttocks, R. thigh circumferentially and down to the toes.  He got multiple spinal injections int he past  with mediocre results. He is on the schedule for surgry tomorrow.: L3-4, L4-5 laminectomy, foraminotomy facetectomy.        T(C): 36.3 (12-29-22 @ 11:35), Max: 37.1 (12-29-22 @ 00:47)  HR: 67 (12-29-22 @ 12:35) (62 - 93)  BP: 115/72 (12-29-22 @ 12:35) (92/53 - 124/60)  RR: 19 (12-29-22 @ 12:35) (12 - 23)  SpO2: 97% (12-29-22 @ 12:35) (96% - 100%)      12-29-22 @ 07:01  -  12-29-22 @ 12:53  --------------------------------------------------------  IN: 130 mL / OUT: 10 mL / NET: 120 mL        acetaminophen     Tablet .. 1000 milliGRAM(s) Oral every 8 hours  ceFAZolin   IVPB 2000 milliGRAM(s) IV Intermittent every 8 hours  celecoxib 200 milliGRAM(s) Oral once  diazepam    Tablet 5 milliGRAM(s) Oral every 8 hours PRN  emtricitabine 200 mG/rilpivirine 25 mG/tenofovir alafenamide 25 mG (ODEFSEY) Tablet 1 Tablet(s) Oral daily  gabapentin 800 milliGRAM(s) Oral three times a day  HYDROmorphone  Injectable 0.5 milliGRAM(s) IV Push every 2 hours PRN  HYDROmorphone  Injectable 0.5 milliGRAM(s) IV Push once  multivitamin 1 Tablet(s) Oral daily  ondansetron   Disintegrating Tablet 4 milliGRAM(s) Oral every 6 hours  oxyCODONE    IR 10 milliGRAM(s) Oral every 6 hours PRN  oxyCODONE    IR 5 milliGRAM(s) Oral every 4 hours PRN  polyethylene glycol 3350 17 Gram(s) Oral daily PRN  senna 2 Tablet(s) Oral at bedtime  sodium chloride 0.9%. 1000 milliLiter(s) IV Continuous <Continuous>      Exam:  Constitutional: NAD, well groomed, well nourished  Respiratory: breathing non-labored, symmetrical chest wall movement  Cardiovascuar: RRR, no murmurs  Gastrointestinal: abdomen soft, non tender  Genitourinary: exam deffered  Neurological:  AAOX3. Face symmetric, Verbal function intact, speech clear  Cranial Nerves: II-XII intact  Motor: 5/5 power in b/l upper extremities and LLE 5/5, RLE 4+/5 limited secondary to pain. Diminished sensation in b/l lower extremities right greater than left   Sensation: intact to light touch in all extremities  Extremities: distal pulses 2+ x4    WOUND/DRAINS: Posterior HMV x 1 to self suction     DEVICES: no may       Assessment: 53 yo M PMH HIV (on HAART, undetectable VL) here with LBP now s/p L3-5 laminectomies and foraminotomies (12/29/22)        Plan:  Neuro  -neuro and vital checks q4  -post op ERAS  -HMV x 1   -no post op imaging required     Cardio  -normotensive    Pulm  -RA  -ISS    GI  -ADAT  -bowel regimen    Renal  -IVF    Heme  -SCDs, SQL on hold     Endo  -no active issues    ID  -afebrile  -post op ancef   -cont home HIV medication     Regional status, full code, PT/OT pending    D/w Dr. Contreras     Assessment:  Present when checked    []  GCS  E   V  M     Heart Failure: []Acute, [] acute on chronic , []chronic  Heart Failure:  [] Diastolic (HFpEF), [] Systolic (HFrEF), []Combined (HFpEF and HFrEF), [] RHF, [] Pulm HTN, [] Other    [] ALAINA, [] ATN, [] AIN, [] other  [] CKD1, [] CKD2, [] CKD 3, [] CKD 4, [] CKD 5, []ESRD    Encephalopathy: [] Metabolic, [] Hepatic, [] toxic, [] Neurological, [] Other    Abnormal Nurtitional Status: [] malnurtition (see nutrition note), [ ]underweight: BMI < 19, [] morbid obesity: BMI >40, [] Cachexia    [] Sepsis  [] hypovolemic shock,[] cardiogenic shock, [] hemorrhagic shock, [] neuogenic shock  [] Acute Respiratory Failure  []Cerebral edema, [] Brain compression/ herniation,   [] Functional quadriplegia  [] Acute blood loss anemia       NEUROSURGERY POST OP NOTE:    POD# 0 S/P L3-5 laminectomies and foraminotomies     S: 53 yo male with history of hiv on haart undetectable vl, here with back pain. Reports progressive pain lower back, radiating down both legs, more on right, x several years but now has acute severe worsening. Associated with numbness and feeling of pressure, more on right. Denies trauma. Patient has severe lumbar spinal stenosis with cauda equina compression, and disc herniations.  Patient admits to severe back pain that radiates primarily to RLE. Pain begins at midline lower back travels to the buttocks, R. thigh circumferentially and down to the toes.  He got multiple spinal injections int he past  with mediocre results. He is on the schedule for surgry tomorrow.: L3-4, L4-5 laminectomy, foraminotomy facetectomy.        T(C): 36.3 (12-29-22 @ 11:35), Max: 37.1 (12-29-22 @ 00:47)  HR: 67 (12-29-22 @ 12:35) (62 - 93)  BP: 115/72 (12-29-22 @ 12:35) (92/53 - 124/60)  RR: 19 (12-29-22 @ 12:35) (12 - 23)  SpO2: 97% (12-29-22 @ 12:35) (96% - 100%)      12-29-22 @ 07:01  -  12-29-22 @ 12:53  --------------------------------------------------------  IN: 130 mL / OUT: 10 mL / NET: 120 mL        acetaminophen     Tablet .. 1000 milliGRAM(s) Oral every 8 hours  ceFAZolin   IVPB 2000 milliGRAM(s) IV Intermittent every 8 hours  celecoxib 200 milliGRAM(s) Oral once  diazepam    Tablet 5 milliGRAM(s) Oral every 8 hours PRN  emtricitabine 200 mG/rilpivirine 25 mG/tenofovir alafenamide 25 mG (ODEFSEY) Tablet 1 Tablet(s) Oral daily  gabapentin 800 milliGRAM(s) Oral three times a day  HYDROmorphone  Injectable 0.5 milliGRAM(s) IV Push every 2 hours PRN  HYDROmorphone  Injectable 0.5 milliGRAM(s) IV Push once  multivitamin 1 Tablet(s) Oral daily  ondansetron   Disintegrating Tablet 4 milliGRAM(s) Oral every 6 hours  oxyCODONE    IR 10 milliGRAM(s) Oral every 6 hours PRN  oxyCODONE    IR 5 milliGRAM(s) Oral every 4 hours PRN  polyethylene glycol 3350 17 Gram(s) Oral daily PRN  senna 2 Tablet(s) Oral at bedtime  sodium chloride 0.9%. 1000 milliLiter(s) IV Continuous <Continuous>      Exam:  Constitutional: NAD, well groomed, well nourished  Respiratory: breathing non-labored, symmetrical chest wall movement  Cardiovascuar: RRR, no murmurs  Gastrointestinal: abdomen soft, non tender  Genitourinary: exam deffered  Neurological:  AAOX3. Face symmetric, Verbal function intact, speech clear  Cranial Nerves: II-XII intact  Motor: 5/5 power in b/l upper extremities and LLE 5/5, RLE KE 4+/5 limited secondary to pain o/w full strength. Diminished sensation in b/l lower extremities right greater than left   Sensation: intact to light touch in all extremities  Extremities: distal pulses 2+ x4    WOUND/DRAINS: Posterior HMV x 1 to self suction       Assessment: 53 yo M PMH HIV (on HAART, undetectable VL) here with LBP now s/p L3-5 laminectomies and foraminotomies (12/29/22)        Plan:  Neuro  -neuro and vital checks q4  -post op ERAS  -HMV x 1 to self suction   -no post op imaging required     Cardio  -normotensive    Pulm  -RA  -IS    GI  -ADAT  -bowel regimen    Renal  -IVF    Heme  -SCDs, SQL on hold     Endo  -no active issues    ID  -afebrile  -post op ancef   -cont home HIV medication     Regional status, full code, PT/OT pending    D/w Dr. Contreras       Assessment:  Present when checked    []  GCS  E   V  M     Heart Failure: []Acute, [] acute on chronic , []chronic  Heart Failure:  [] Diastolic (HFpEF), [] Systolic (HFrEF), []Combined (HFpEF and HFrEF), [] RHF, [] Pulm HTN, [] Other    [] ALAINA, [] ATN, [] AIN, [] other  [] CKD1, [] CKD2, [] CKD 3, [] CKD 4, [] CKD 5, []ESRD    Encephalopathy: [] Metabolic, [] Hepatic, [] toxic, [] Neurological, [] Other    Abnormal Nurtitional Status: [] malnurtition (see nutrition note), [ ]underweight: BMI < 19, [] morbid obesity: BMI >40, [] Cachexia    [] Sepsis  [] hypovolemic shock,[] cardiogenic shock, [] hemorrhagic shock, [] neuogenic shock  [] Acute Respiratory Failure  []Cerebral edema, [] Brain compression/ herniation,   [] Functional quadriplegia  [] Acute blood loss anemia

## 2022-12-30 DIAGNOSIS — D72.829 ELEVATED WHITE BLOOD CELL COUNT, UNSPECIFIED: ICD-10-CM

## 2022-12-30 DIAGNOSIS — D62 ACUTE POSTHEMORRHAGIC ANEMIA: ICD-10-CM

## 2022-12-30 DIAGNOSIS — K59.00 CONSTIPATION, UNSPECIFIED: ICD-10-CM

## 2022-12-30 DIAGNOSIS — M48.061 SPINAL STENOSIS, LUMBAR REGION WITHOUT NEUROGENIC CLAUDICATION: ICD-10-CM

## 2022-12-30 PROBLEM — Z87.39 PERSONAL HISTORY OF OTHER DISEASES OF THE MUSCULOSKELETAL SYSTEM AND CONNECTIVE TISSUE: Chronic | Status: ACTIVE | Noted: 2022-12-28

## 2022-12-30 LAB
ANION GAP SERPL CALC-SCNC: 6 MMOL/L — SIGNIFICANT CHANGE UP (ref 5–17)
BASOPHILS # BLD AUTO: 0.01 K/UL — SIGNIFICANT CHANGE UP (ref 0–0.2)
BASOPHILS NFR BLD AUTO: 0.1 % — SIGNIFICANT CHANGE UP (ref 0–2)
BUN SERPL-MCNC: 14 MG/DL — SIGNIFICANT CHANGE UP (ref 7–23)
CALCIUM SERPL-MCNC: 9 MG/DL — SIGNIFICANT CHANGE UP (ref 8.4–10.5)
CHLORIDE SERPL-SCNC: 100 MMOL/L — SIGNIFICANT CHANGE UP (ref 96–108)
CO2 SERPL-SCNC: 27 MMOL/L — SIGNIFICANT CHANGE UP (ref 22–31)
CREAT SERPL-MCNC: 0.77 MG/DL — SIGNIFICANT CHANGE UP (ref 0.5–1.3)
EGFR: 108 ML/MIN/1.73M2 — SIGNIFICANT CHANGE UP
EOSINOPHIL # BLD AUTO: 0 K/UL — SIGNIFICANT CHANGE UP (ref 0–0.5)
EOSINOPHIL NFR BLD AUTO: 0 % — SIGNIFICANT CHANGE UP (ref 0–6)
GLUCOSE SERPL-MCNC: 109 MG/DL — HIGH (ref 70–99)
HCT VFR BLD CALC: 36.5 % — LOW (ref 39–50)
HGB BLD-MCNC: 12.5 G/DL — LOW (ref 13–17)
IMM GRANULOCYTES NFR BLD AUTO: 0.4 % — SIGNIFICANT CHANGE UP (ref 0–0.9)
LYMPHOCYTES # BLD AUTO: 1.41 K/UL — SIGNIFICANT CHANGE UP (ref 1–3.3)
LYMPHOCYTES # BLD AUTO: 11.9 % — LOW (ref 13–44)
MAGNESIUM SERPL-MCNC: 1.8 MG/DL — SIGNIFICANT CHANGE UP (ref 1.6–2.6)
MCHC RBC-ENTMCNC: 34.1 PG — HIGH (ref 27–34)
MCHC RBC-ENTMCNC: 34.2 GM/DL — SIGNIFICANT CHANGE UP (ref 32–36)
MCV RBC AUTO: 99.5 FL — SIGNIFICANT CHANGE UP (ref 80–100)
MONOCYTES # BLD AUTO: 0.64 K/UL — SIGNIFICANT CHANGE UP (ref 0–0.9)
MONOCYTES NFR BLD AUTO: 5.4 % — SIGNIFICANT CHANGE UP (ref 2–14)
NEUTROPHILS # BLD AUTO: 9.78 K/UL — HIGH (ref 1.8–7.4)
NEUTROPHILS NFR BLD AUTO: 82.2 % — HIGH (ref 43–77)
NRBC # BLD: 0 /100 WBCS — SIGNIFICANT CHANGE UP (ref 0–0)
PHOSPHATE SERPL-MCNC: 3.4 MG/DL — SIGNIFICANT CHANGE UP (ref 2.5–4.5)
PLATELET # BLD AUTO: 192 K/UL — SIGNIFICANT CHANGE UP (ref 150–400)
POTASSIUM SERPL-MCNC: 4.5 MMOL/L — SIGNIFICANT CHANGE UP (ref 3.5–5.3)
POTASSIUM SERPL-SCNC: 4.5 MMOL/L — SIGNIFICANT CHANGE UP (ref 3.5–5.3)
RBC # BLD: 3.67 M/UL — LOW (ref 4.2–5.8)
RBC # FLD: 11.9 % — SIGNIFICANT CHANGE UP (ref 10.3–14.5)
SODIUM SERPL-SCNC: 133 MMOL/L — LOW (ref 135–145)
WBC # BLD: 11.89 K/UL — HIGH (ref 3.8–10.5)
WBC # FLD AUTO: 11.89 K/UL — HIGH (ref 3.8–10.5)

## 2022-12-30 PROCEDURE — 99232 SBSQ HOSP IP/OBS MODERATE 35: CPT

## 2022-12-30 RX ORDER — HYDROMORPHONE HYDROCHLORIDE 2 MG/ML
0.5 INJECTION INTRAMUSCULAR; INTRAVENOUS; SUBCUTANEOUS ONCE
Refills: 0 | Status: DISCONTINUED | OUTPATIENT
Start: 2022-12-30 | End: 2022-12-30

## 2022-12-30 RX ORDER — POTASSIUM CHLORIDE 20 MEQ
40 PACKET (EA) ORAL EVERY 4 HOURS
Refills: 0 | Status: COMPLETED | OUTPATIENT
Start: 2022-12-30 | End: 2022-12-30

## 2022-12-30 RX ORDER — DIAZEPAM 5 MG
5 TABLET ORAL EVERY 8 HOURS
Refills: 0 | Status: DISCONTINUED | OUTPATIENT
Start: 2022-12-30 | End: 2023-01-01

## 2022-12-30 RX ORDER — MAGNESIUM OXIDE 400 MG ORAL TABLET 241.3 MG
400 TABLET ORAL ONCE
Refills: 0 | Status: DISCONTINUED | OUTPATIENT
Start: 2022-12-30 | End: 2022-12-31

## 2022-12-30 RX ADMIN — GABAPENTIN 800 MILLIGRAM(S): 400 CAPSULE ORAL at 22:15

## 2022-12-30 RX ADMIN — Medication 1 TABLET(S): at 13:11

## 2022-12-30 RX ADMIN — Medication 4 MILLIGRAM(S): at 05:07

## 2022-12-30 RX ADMIN — Medication 1000 MILLIGRAM(S): at 06:00

## 2022-12-30 RX ADMIN — OXYCODONE HYDROCHLORIDE 10 MILLIGRAM(S): 5 TABLET ORAL at 10:04

## 2022-12-30 RX ADMIN — HYDROMORPHONE HYDROCHLORIDE 0.5 MILLIGRAM(S): 2 INJECTION INTRAMUSCULAR; INTRAVENOUS; SUBCUTANEOUS at 01:19

## 2022-12-30 RX ADMIN — OXYCODONE HYDROCHLORIDE 10 MILLIGRAM(S): 5 TABLET ORAL at 01:55

## 2022-12-30 RX ADMIN — GABAPENTIN 800 MILLIGRAM(S): 400 CAPSULE ORAL at 14:54

## 2022-12-30 RX ADMIN — OXYCODONE HYDROCHLORIDE 10 MILLIGRAM(S): 5 TABLET ORAL at 18:54

## 2022-12-30 RX ADMIN — SENNA PLUS 2 TABLET(S): 8.6 TABLET ORAL at 22:15

## 2022-12-30 RX ADMIN — Medication 4 MILLIGRAM(S): at 14:54

## 2022-12-30 RX ADMIN — HYDROMORPHONE HYDROCHLORIDE 0.5 MILLIGRAM(S): 2 INJECTION INTRAMUSCULAR; INTRAVENOUS; SUBCUTANEOUS at 00:19

## 2022-12-30 RX ADMIN — Medication 1000 MILLIGRAM(S): at 14:54

## 2022-12-30 RX ADMIN — Medication 1000 MILLIGRAM(S): at 22:15

## 2022-12-30 RX ADMIN — Medication 2 MILLIGRAM(S): at 22:16

## 2022-12-30 RX ADMIN — Medication 5 MILLIGRAM(S): at 22:16

## 2022-12-30 RX ADMIN — Medication 40 MILLIEQUIVALENT(S): at 17:54

## 2022-12-30 RX ADMIN — Medication 1000 MILLIGRAM(S): at 15:54

## 2022-12-30 RX ADMIN — Medication 100 MILLIGRAM(S): at 00:20

## 2022-12-30 RX ADMIN — Medication 40 MILLIEQUIVALENT(S): at 14:55

## 2022-12-30 RX ADMIN — OXYCODONE HYDROCHLORIDE 10 MILLIGRAM(S): 5 TABLET ORAL at 17:54

## 2022-12-30 RX ADMIN — POLYETHYLENE GLYCOL 3350 17 GRAM(S): 17 POWDER, FOR SOLUTION ORAL at 13:11

## 2022-12-30 RX ADMIN — EMTRICITABINE, RILPIVIRINE HYDROCHLORIDE, AND TENOFOVIR DISOPROXIL FUMARATE 1 TABLET(S): 200; 25; 300 TABLET, FILM COATED ORAL at 13:12

## 2022-12-30 RX ADMIN — OXYCODONE HYDROCHLORIDE 10 MILLIGRAM(S): 5 TABLET ORAL at 02:50

## 2022-12-30 RX ADMIN — Medication 1000 MILLIGRAM(S): at 05:07

## 2022-12-30 RX ADMIN — PANTOPRAZOLE SODIUM 40 MILLIGRAM(S): 20 TABLET, DELAYED RELEASE ORAL at 05:15

## 2022-12-30 RX ADMIN — GABAPENTIN 800 MILLIGRAM(S): 400 CAPSULE ORAL at 05:06

## 2022-12-30 RX ADMIN — OXYCODONE HYDROCHLORIDE 10 MILLIGRAM(S): 5 TABLET ORAL at 09:04

## 2022-12-30 RX ADMIN — Medication 5 MILLIGRAM(S): at 14:53

## 2022-12-30 RX ADMIN — Medication 1000 MILLIGRAM(S): at 23:15

## 2022-12-30 NOTE — PROGRESS NOTE ADULT - ASSESSMENT
52 male with PMHx of HIV and severe lumbar spinal stenosis admitted for  L3-4, L4-5 laminectomy, foraminotomy facetectomy on 12/29     #Spinal stenosis  -s/p  L3-4, L4-5 laminectomy, foraminotomy facetectomy on 12/29/2022.  - management per primary team for pain and drain  - PT/OT - home no needs    #HIV  Hx of HIV. VLUD per patient. Compliant with medications   - on Odefsey 100/25/25mg once daily.   - continue with medications    #Leukocytosis  - likely reactive, WBC 11  - no signs of infection    #Blood loss anemia  - Hgb 12 from base 13, likely component of surgical blood loss  - no signs or symptoms of active bleeding    Dispo: pending drain removal, PT rec home no needs   52 male with PMHx of HIV and severe lumbar spinal stenosis admitted for  L3-4, L4-5 laminectomy, foraminotomy facetectomy on 12/29     #Spinal stenosis  -s/p  L3-4, L4-5 laminectomy, foraminotomy facetectomy on 12/29/2022.  - management per primary team for pain and drain  - PT/OT - home no needs    #HIV  Hx of HIV. VLUD per patient. Compliant with medications. CD4 > 900 for years as outpatient per patient, no indication for prophylaxis  - on Odefsey 100/25/25mg once daily.   - continue with medications    #Leukocytosis  - likely reactive, WBC 11  - no signs of infection    #Blood loss anemia  - Hgb 12 from base 13, likely component of surgical blood loss  - no signs or symptoms of active bleeding    #Constipation  - stool softeners while on opioids    Dispo: pending drain removal, PT rec home no needs

## 2022-12-30 NOTE — CHART NOTE - NSCHARTNOTEFT_GEN_A_CORE
Patient neurologically stable. Na 133 today no intervention plan to recheck with AM labs. Valium changed from prn to standing per Dr. Contreras. Recommended for home no needs, Rx for rolling walker in the chart.

## 2022-12-30 NOTE — PROGRESS NOTE ADULT - SUBJECTIVE AND OBJECTIVE BOX
HPI:  51 yo male with history of hiv on haart undetectable vl, here with back pain. Reports progressive pain lower back, radiating down both legs, more on right, x several years but now has acute severe worsening. Associated with numbness and feeling of pressure, more on right. Denies trauma. Patient has severe lumbar spinal stenosis with cauda equina compression, and disc herniations.  Patient admits to severe back pain that radiates primarily to RLE. Pain begins at midline lower back travels to the buttocks, R. thigh circumferentially and down to the toes.  He got multiple spinal injections int he past  with mediocre results.  He is on the schedule for surgry tomorrow.: L3-4, L4-5 laminectomy, foraminotomy facetectomy.     (28 Dec 2022 18:13)    OVERNIGHT: ASHWIN o/n, neurostable. One time 0.5mg dilaudid PRN given for breakthrough pain.       Hospital course:   12/28: Preop OR tomorrow lumbar laminectomy, facetectomy, foraminotomy L3-4, L4-5  12/29: POD 0. emesis o/n, given zofran. Neuro stable. Trops and EKG ordered for nonspecific changes in inferior leads.   12/30: POD1. ASHWIN o/n, neurostable. One time 0.5mg dilaudid PRN given for breakthrough pain.     Vital Signs Last 24 Hrs  T(C): 36.7 (29 Dec 2022 20:41), Max: 37.1 (29 Dec 2022 00:47)  T(F): 98.1 (29 Dec 2022 20:41), Max: 98.7 (29 Dec 2022 00:47)  HR: 64 (29 Dec 2022 20:41) (60 - 85)  BP: 99/63 (29 Dec 2022 20:41) (92/57 - 124/60)  BP(mean): 81 (29 Dec 2022 13:25) (77 - 91)  RR: 17 (29 Dec 2022 20:41) (12 - 23)  SpO2: 95% (29 Dec 2022 20:41) (95% - 100%)    Parameters below as of 29 Dec 2022 20:41  Patient On (Oxygen Delivery Method): room air        I&O's Summary    29 Dec 2022 07:01  -  30 Dec 2022 00:21  --------------------------------------------------------  IN: 586.3 mL / OUT: 1025 mL / NET: -438.7 mL        PHYSICAL EXAM:  Constitutional: NAD, well groomed, well nourished  Respiratory: breathing non-labored, symmetrical chest wall movement  Cardiovascuar: RRR, no murmurs  Gastrointestinal: abdomen soft, non tender  Genitourinary: exam deffered  Neurological:  AAOX3. Face symmetric, Verbal function intact, speech clear  Cranial Nerves: II-XII intact  Motor: 5/5 power in b/l upper extremities and LLE 5/5, RLE KE 4+/5 limited secondary to pain o/w full strength. Diminished sensation in b/l lower extremities right greater than left   Sensation: intact to light touch in all extremities  Extremities: distal pulses 2+ x4    WOUND/DRAINS: Posterior HMV x 1 to self suction     TUBES/LINES:  [] Guzmán  [] A-line  [] Lumbar Drain  [] Wound Drains  [] NGT   [] EVD   [] CVC  [] Other      DIET:  [] NPO  [x] Mechanical  [] Tube feeds    LABS:                        13.1   5.47  )-----------( 201      ( 29 Dec 2022 05:51 )             38.3     12-29    138  |  103  |  20  ----------------------------<  105<H>  4.6   |  27  |  0.89    Ca    9.3      29 Dec 2022 05:51  Phos  4.0     12-29  Mg     2.1     12-29      PT/INR - ( 28 Dec 2022 17:20 )   PT: 13.1 sec;   INR: 1.10          PTT - ( 28 Dec 2022 17:20 )  PTT:29.7 sec    CARDIAC MARKERS ( 28 Dec 2022 22:46 )  x     / 0.01 ng/mL / 414 U/L / x     / 3.6 ng/mL      CAPILLARY BLOOD GLUCOSE          Drug Levels: [] N/A    CSF Analysis: [] N/A      Allergies    No Known Allergies    Intolerances        Home Medications:  gabapentin 800 mg oral tablet: 1 tab(s) orally 3 times a day (28 Dec 2022 10:02)      MEDICATIONS:  MEDICATIONS  (STANDING):  acetaminophen     Tablet .. 1000 milliGRAM(s) Oral every 8 hours  dexAMETHasone     Tablet 4 milliGRAM(s) Oral every 8 hours  dexAMETHasone     Tablet 2 milliGRAM(s) Oral every 8 hours  dexAMETHasone     Tablet   Oral   emtricitabine 200 mG/rilpivirine 25 mG/tenofovir alafenamide 25 mG (ODEFSEY) Tablet 1 Tablet(s) Oral daily  gabapentin 800 milliGRAM(s) Oral three times a day  multivitamin 1 Tablet(s) Oral daily  ondansetron   Disintegrating Tablet 4 milliGRAM(s) Oral every 6 hours  pantoprazole    Tablet 40 milliGRAM(s) Oral before breakfast  polyethylene glycol 3350 17 Gram(s) Oral daily  senna 2 Tablet(s) Oral at bedtime    MEDICATIONS  (PRN):  diazepam    Tablet 5 milliGRAM(s) Oral every 8 hours PRN muscle spasm  oxyCODONE    IR 5 milliGRAM(s) Oral every 4 hours PRN Moderate Pain (4 - 6)  oxyCODONE    IR 10 milliGRAM(s) Oral every 4 hours PRN Severe Pain (7 - 10)      CULTURES:      RADIOLOGY & ADDITIONAL TESTS:      ASSESSMENT:  Assessment: 51 yo M PMH HIV (on HAART, undetectable VL) here with LBP now s/p L3-5 laminectomies and foraminotomies (12/29/22)    Plan:  Neuro  -neuro and vital checks q4  -post op ERAS  -HMV x 1 to self suction   - dex taper to off   -no post op imaging required     Cardio  -normotensive    Pulm  -RA  -IS    GI  -ADAT  -bowel regimen    Renal  -IVL    Heme  -SCDs, SQL on hold     Endo  -no active issues    ID  -afebrile  -post op ancef   -cont home HIV medication     Regional status, full code, PT/OT pending    D/w Dr. Contreras       Assessment: present when checked     [] GCS   E   V   M     Heart Failure: [] Acute, [] acute on chronic, [] chronic   Heart Failure: [] Diastolic (HFpEF), [] Systolic (HRrEF), [] Combined (HFpEF and HFrEF), [] RHF, [] Pulm HTN, [] Other     [] ALAINA, [] ATN, [] AIN, [] other   [] CKD1, [] CKD2, [] CKD3, [] CKD4, [] CKD5, [] ESRD     Encephalopathy: [] Metabolic, [] Hepatic, [] Toxic, [] Neurological, [] Other     Abnormal Nutritional Status: [] malnutrition (see nutrition note), []underweight: BMI <19, [] morbid obesity: BMI >40, [] Cachexia     [] Sepsis   [] Hypovolemic shock, [] Cardiogenic shock, [] Hemorrhagic shock, [] Neurogenic shock   [] Acute respiratory failure   [] Cerebral edema, [] Brain compression / herniation   [] Functional quadriplegia   [] Acute blood loss anemia    HPI:  51 yo male with history of hiv on haart undetectable vl, here with back pain. Reports progressive pain lower back, radiating down both legs, more on right, x several years but now has acute severe worsening. Associated with numbness and feeling of pressure, more on right. Denies trauma. Patient has severe lumbar spinal stenosis with cauda equina compression, and disc herniations.  Patient admits to severe back pain that radiates primarily to RLE. Pain begins at midline lower back travels to the buttocks, R. thigh circumferentially and down to the toes.  He got multiple spinal injections int he past  with mediocre results.  He is on the schedule for surgry tomorrow.: L3-4, L4-5 laminectomy, foraminotomy facetectomy.     (28 Dec 2022 18:13)    OVERNIGHT: ASHWIN o/n, neurostable. One time 0.5mg dilaudid PRN given for breakthrough pain.       Hospital course:   12/28: Preop OR tomorrow lumbar laminectomy, facetectomy, foraminotomy L3-4, L4-5  12/29: POD 0. emesis o/n, given zofran. Neuro stable. Trops and EKG ordered for nonspecific changes in inferior leads.   12/30: POD1. ASHWIN o/n, neurostable. One time 0.5mg dilaudid PRN given for breakthrough pain.     Vital Signs Last 24 Hrs  T(C): 36.7 (29 Dec 2022 20:41), Max: 37.1 (29 Dec 2022 00:47)  T(F): 98.1 (29 Dec 2022 20:41), Max: 98.7 (29 Dec 2022 00:47)  HR: 64 (29 Dec 2022 20:41) (60 - 85)  BP: 99/63 (29 Dec 2022 20:41) (92/57 - 124/60)  BP(mean): 81 (29 Dec 2022 13:25) (77 - 91)  RR: 17 (29 Dec 2022 20:41) (12 - 23)  SpO2: 95% (29 Dec 2022 20:41) (95% - 100%)    Parameters below as of 29 Dec 2022 20:41  Patient On (Oxygen Delivery Method): room air        I&O's Summary    29 Dec 2022 07:01  -  30 Dec 2022 00:21  --------------------------------------------------------  IN: 586.3 mL / OUT: 1025 mL / NET: -438.7 mL        PHYSICAL EXAM:  Constitutional: NAD, well groomed, well nourished  Respiratory: breathing non-labored, symmetrical chest wall movement  Cardiovascuar: RRR, no murmurs  Gastrointestinal: abdomen soft, non tender  Genitourinary: exam deffered  Neurological:  AAOX3. Face symmetric, Verbal function intact, speech clear  Cranial Nerves: II-XII intact  Motor: 5/5 power in b/l upper extremities and LLE 5/5, RLE KE 4+/5 limited secondary to pain o/w full strength. Diminished sensation in b/l lower extremities right greater than left   Extremities: distal pulses 2+ x4    WOUND/DRAINS: Posterior HMV x 1 to self suction     TUBES/LINES:  [] Guzmán  [] A-line  [] Lumbar Drain  [] Wound Drains  [] NGT   [] EVD   [] CVC  [] Other      DIET:  [] NPO  [x] Mechanical  [] Tube feeds    LABS:                        13.1   5.47  )-----------( 201      ( 29 Dec 2022 05:51 )             38.3     12-29    138  |  103  |  20  ----------------------------<  105<H>  4.6   |  27  |  0.89    Ca    9.3      29 Dec 2022 05:51  Phos  4.0     12-29  Mg     2.1     12-29      PT/INR - ( 28 Dec 2022 17:20 )   PT: 13.1 sec;   INR: 1.10          PTT - ( 28 Dec 2022 17:20 )  PTT:29.7 sec    CARDIAC MARKERS ( 28 Dec 2022 22:46 )  x     / 0.01 ng/mL / 414 U/L / x     / 3.6 ng/mL      CAPILLARY BLOOD GLUCOSE          Drug Levels: [] N/A    CSF Analysis: [] N/A      Allergies    No Known Allergies    Intolerances        Home Medications:  gabapentin 800 mg oral tablet: 1 tab(s) orally 3 times a day (28 Dec 2022 10:02)      MEDICATIONS:  MEDICATIONS  (STANDING):  acetaminophen     Tablet .. 1000 milliGRAM(s) Oral every 8 hours  dexAMETHasone     Tablet 4 milliGRAM(s) Oral every 8 hours  dexAMETHasone     Tablet 2 milliGRAM(s) Oral every 8 hours  dexAMETHasone     Tablet   Oral   emtricitabine 200 mG/rilpivirine 25 mG/tenofovir alafenamide 25 mG (ODEFSEY) Tablet 1 Tablet(s) Oral daily  gabapentin 800 milliGRAM(s) Oral three times a day  multivitamin 1 Tablet(s) Oral daily  ondansetron   Disintegrating Tablet 4 milliGRAM(s) Oral every 6 hours  pantoprazole    Tablet 40 milliGRAM(s) Oral before breakfast  polyethylene glycol 3350 17 Gram(s) Oral daily  senna 2 Tablet(s) Oral at bedtime    MEDICATIONS  (PRN):  diazepam    Tablet 5 milliGRAM(s) Oral every 8 hours PRN muscle spasm  oxyCODONE    IR 5 milliGRAM(s) Oral every 4 hours PRN Moderate Pain (4 - 6)  oxyCODONE    IR 10 milliGRAM(s) Oral every 4 hours PRN Severe Pain (7 - 10)      CULTURES:      RADIOLOGY & ADDITIONAL TESTS:      ASSESSMENT:  Assessment: 51 yo M PMH HIV (on HAART, undetectable VL) here with LBP now s/p L3-5 laminectomies and foraminotomies (12/29/22)    Plan:  Neuro  -neuro and vital checks q4  -post op ERAS  -HMV x 1 to self suction   - dex taper to off   -no post op imaging required     Cardio  -normotensive    Pulm  -RA  -IS    GI  -ADAT  -bowel regimen    Renal  -IVL    Heme  -SCDs, SQL on hold     Endo  -no active issues    ID  -afebrile  -post op ancef   -cont home HIV medication     Regional status, full code, PT/OT pending    D/w Dr. Contreras       Assessment: present when checked     [] GCS   E   V   M     Heart Failure: [] Acute, [] acute on chronic, [] chronic   Heart Failure: [] Diastolic (HFpEF), [] Systolic (HRrEF), [] Combined (HFpEF and HFrEF), [] RHF, [] Pulm HTN, [] Other     [] ALAINA, [] ATN, [] AIN, [] other   [] CKD1, [] CKD2, [] CKD3, [] CKD4, [] CKD5, [] ESRD     Encephalopathy: [] Metabolic, [] Hepatic, [] Toxic, [] Neurological, [] Other     Abnormal Nutritional Status: [] malnutrition (see nutrition note), []underweight: BMI <19, [] morbid obesity: BMI >40, [] Cachexia     [] Sepsis   [] Hypovolemic shock, [] Cardiogenic shock, [] Hemorrhagic shock, [] Neurogenic shock   [] Acute respiratory failure   [] Cerebral edema, [] Brain compression / herniation   [] Functional quadriplegia   [] Acute blood loss anemia

## 2022-12-30 NOTE — PROGRESS NOTE ADULT - SUBJECTIVE AND OBJECTIVE BOX
Patient is a 52y old  Male who presents with a chief complaint of lumbar stenosis for surgery on 12/29/2022. (30 Dec 2022 14:44)      SUBJECTIVE:  Patient seen and examined at bedside.  Feels that back pain is improving, comfortable lying in bed.  Only complaint besides back pain is of constipation for past several days, requesting enema    ROS:  Denies fevers, chills, headache, vision changes, neck pain, cough, SOB, chest pain, Abdominal pain, N/V, dysuria or new rash.  All other ROS negative except as above    Vital Signs Last 12 Hrs  T(F): 97.6 (12-30-22 @ 08:44), Max: 98.3 (12-30-22 @ 05:15)  HR: 59 (12-30-22 @ 08:44) (56 - 59)  BP: 118/68 (12-30-22 @ 08:44) (97/64 - 118/68)  BP(mean): --  RR: 18 (12-30-22 @ 08:44) (16 - 18)  SpO2: 98% (12-30-22 @ 08:44) (97% - 98%)  I&O's Summary    29 Dec 2022 07:01  -  30 Dec 2022 07:00  --------------------------------------------------------  IN: 586.3 mL / OUT: 2130 mL / NET: -1543.7 mL        PHYSICAL EXAM:  Constitutional: NAD, comfortable in bed.  HEENT: PERRLA, EOMI, no conjunctival pallor or scleral icterus, MMM  Neck: Supple, no JVD  Respiratory: CTA B/L. No w/r/r.   Cardiovascular: RRR, normal S1 and S2, no m/r/g.   Gastrointestinal: +BS, soft NTND, no guarding or rebound tenderness, no palpable masses   Extremities: wwp; no cyanosis, clubbing or edema.   Back: HMV in place  Vascular: Pulses equal and strong throughout.   Neurological: AAOx3, no CN deficits, strength and sensation intact throughout.   Skin: No gross skin abnormalities or rashes        LABS:                        12.5   11.89 )-----------( 192      ( 30 Dec 2022 06:01 )             36.5     12-30    133<L>  |  100  |  14  ----------------------------<  109<H>  4.5   |  27  |  0.77    Ca    9.0      30 Dec 2022 06:01  Phos  3.4     12-30  Mg     1.8     12-30      PT/INR - ( 28 Dec 2022 17:20 )   PT: 13.1 sec;   INR: 1.10          PTT - ( 28 Dec 2022 17:20 )  PTT:29.7 sec        RADIOLOGY & ADDITIONAL TESTS:  No new imaging    MEDICATIONS  (STANDING):  acetaminophen     Tablet .. 1000 milliGRAM(s) Oral every 8 hours  dexAMETHasone     Tablet 2 milliGRAM(s) Oral every 8 hours  dexAMETHasone     Tablet   Oral   diazepam    Tablet 5 milliGRAM(s) Oral every 8 hours  emtricitabine 200 mG/rilpivirine 25 mG/tenofovir alafenamide 25 mG (ODEFSEY) Tablet 1 Tablet(s) Oral daily  gabapentin 800 milliGRAM(s) Oral three times a day  magnesium oxide 400 milliGRAM(s) Oral once  multivitamin 1 Tablet(s) Oral daily  ondansetron   Disintegrating Tablet 4 milliGRAM(s) Oral every 6 hours  pantoprazole    Tablet 40 milliGRAM(s) Oral before breakfast  polyethylene glycol 3350 17 Gram(s) Oral daily  potassium chloride    Tablet ER 40 milliEquivalent(s) Oral every 4 hours  senna 2 Tablet(s) Oral at bedtime    MEDICATIONS  (PRN):  oxyCODONE    IR 5 milliGRAM(s) Oral every 4 hours PRN Moderate Pain (4 - 6)  oxyCODONE    IR 10 milliGRAM(s) Oral every 4 hours PRN Severe Pain (7 - 10)

## 2022-12-31 LAB
ANION GAP SERPL CALC-SCNC: 6 MMOL/L — SIGNIFICANT CHANGE UP (ref 5–17)
BASOPHILS # BLD AUTO: 0.02 K/UL — SIGNIFICANT CHANGE UP (ref 0–0.2)
BASOPHILS NFR BLD AUTO: 0.1 % — SIGNIFICANT CHANGE UP (ref 0–2)
BUN SERPL-MCNC: 17 MG/DL — SIGNIFICANT CHANGE UP (ref 7–23)
CALCIUM SERPL-MCNC: 9.1 MG/DL — SIGNIFICANT CHANGE UP (ref 8.4–10.5)
CHLORIDE SERPL-SCNC: 101 MMOL/L — SIGNIFICANT CHANGE UP (ref 96–108)
CO2 SERPL-SCNC: 25 MMOL/L — SIGNIFICANT CHANGE UP (ref 22–31)
CREAT ?TM UR-MCNC: 146 MG/DL — SIGNIFICANT CHANGE UP
CREAT SERPL-MCNC: 0.79 MG/DL — SIGNIFICANT CHANGE UP (ref 0.5–1.3)
EGFR: 107 ML/MIN/1.73M2 — SIGNIFICANT CHANGE UP
EOSINOPHIL # BLD AUTO: 0 K/UL — SIGNIFICANT CHANGE UP (ref 0–0.5)
EOSINOPHIL NFR BLD AUTO: 0 % — SIGNIFICANT CHANGE UP (ref 0–6)
GLUCOSE SERPL-MCNC: 122 MG/DL — HIGH (ref 70–99)
HCT VFR BLD CALC: 37 % — LOW (ref 39–50)
HGB BLD-MCNC: 12.5 G/DL — LOW (ref 13–17)
IMM GRANULOCYTES NFR BLD AUTO: 0.5 % — SIGNIFICANT CHANGE UP (ref 0–0.9)
LYMPHOCYTES # BLD AUTO: 1.84 K/UL — SIGNIFICANT CHANGE UP (ref 1–3.3)
LYMPHOCYTES # BLD AUTO: 13.1 % — SIGNIFICANT CHANGE UP (ref 13–44)
MAGNESIUM SERPL-MCNC: 1.9 MG/DL — SIGNIFICANT CHANGE UP (ref 1.6–2.6)
MCHC RBC-ENTMCNC: 33.8 GM/DL — SIGNIFICANT CHANGE UP (ref 32–36)
MCHC RBC-ENTMCNC: 34 PG — SIGNIFICANT CHANGE UP (ref 27–34)
MCV RBC AUTO: 100.5 FL — HIGH (ref 80–100)
MONOCYTES # BLD AUTO: 0.92 K/UL — HIGH (ref 0–0.9)
MONOCYTES NFR BLD AUTO: 6.6 % — SIGNIFICANT CHANGE UP (ref 2–14)
NEUTROPHILS # BLD AUTO: 11.18 K/UL — HIGH (ref 1.8–7.4)
NEUTROPHILS NFR BLD AUTO: 79.7 % — HIGH (ref 43–77)
NRBC # BLD: 0 /100 WBCS — SIGNIFICANT CHANGE UP (ref 0–0)
OSMOLALITY UR: 1031 MOSM/KG — HIGH (ref 300–900)
PHOSPHATE SERPL-MCNC: 3 MG/DL — SIGNIFICANT CHANGE UP (ref 2.5–4.5)
PLATELET # BLD AUTO: 195 K/UL — SIGNIFICANT CHANGE UP (ref 150–400)
POTASSIUM SERPL-MCNC: 5.1 MMOL/L — SIGNIFICANT CHANGE UP (ref 3.5–5.3)
POTASSIUM SERPL-SCNC: 5.1 MMOL/L — SIGNIFICANT CHANGE UP (ref 3.5–5.3)
RBC # BLD: 3.68 M/UL — LOW (ref 4.2–5.8)
RBC # FLD: 12.2 % — SIGNIFICANT CHANGE UP (ref 10.3–14.5)
SODIUM SERPL-SCNC: 132 MMOL/L — LOW (ref 135–145)
SP GR SPEC: 1.02 — SIGNIFICANT CHANGE UP (ref 1–1.03)
WBC # BLD: 14.03 K/UL — HIGH (ref 3.8–10.5)
WBC # FLD AUTO: 14.03 K/UL — HIGH (ref 3.8–10.5)

## 2022-12-31 PROCEDURE — 99233 SBSQ HOSP IP/OBS HIGH 50: CPT

## 2022-12-31 RX ORDER — SODIUM CHLORIDE 9 MG/ML
2 INJECTION INTRAMUSCULAR; INTRAVENOUS; SUBCUTANEOUS EVERY 12 HOURS
Refills: 0 | Status: DISCONTINUED | OUTPATIENT
Start: 2022-12-31 | End: 2023-01-01

## 2022-12-31 RX ORDER — SODIUM CHLORIDE 9 MG/ML
3 INJECTION INTRAMUSCULAR; INTRAVENOUS; SUBCUTANEOUS EVERY 6 HOURS
Refills: 0 | Status: DISCONTINUED | OUTPATIENT
Start: 2022-12-31 | End: 2022-12-31

## 2022-12-31 RX ADMIN — OXYCODONE HYDROCHLORIDE 10 MILLIGRAM(S): 5 TABLET ORAL at 05:42

## 2022-12-31 RX ADMIN — Medication 1000 MILLIGRAM(S): at 14:43

## 2022-12-31 RX ADMIN — Medication 2 MILLIGRAM(S): at 05:43

## 2022-12-31 RX ADMIN — Medication 1 ENEMA: at 07:56

## 2022-12-31 RX ADMIN — GABAPENTIN 800 MILLIGRAM(S): 400 CAPSULE ORAL at 05:43

## 2022-12-31 RX ADMIN — OXYCODONE HYDROCHLORIDE 10 MILLIGRAM(S): 5 TABLET ORAL at 18:15

## 2022-12-31 RX ADMIN — OXYCODONE HYDROCHLORIDE 10 MILLIGRAM(S): 5 TABLET ORAL at 00:14

## 2022-12-31 RX ADMIN — Medication 1000 MILLIGRAM(S): at 05:42

## 2022-12-31 RX ADMIN — OXYCODONE HYDROCHLORIDE 10 MILLIGRAM(S): 5 TABLET ORAL at 06:37

## 2022-12-31 RX ADMIN — Medication 5 MILLIGRAM(S): at 22:05

## 2022-12-31 RX ADMIN — Medication 5 MILLIGRAM(S): at 07:00

## 2022-12-31 RX ADMIN — Medication 1000 MILLIGRAM(S): at 22:50

## 2022-12-31 RX ADMIN — EMTRICITABINE, RILPIVIRINE HYDROCHLORIDE, AND TENOFOVIR DISOPROXIL FUMARATE 1 TABLET(S): 200; 25; 300 TABLET, FILM COATED ORAL at 12:15

## 2022-12-31 RX ADMIN — OXYCODONE HYDROCHLORIDE 10 MILLIGRAM(S): 5 TABLET ORAL at 01:00

## 2022-12-31 RX ADMIN — POLYETHYLENE GLYCOL 3350 17 GRAM(S): 17 POWDER, FOR SOLUTION ORAL at 12:15

## 2022-12-31 RX ADMIN — GABAPENTIN 800 MILLIGRAM(S): 400 CAPSULE ORAL at 22:05

## 2022-12-31 RX ADMIN — Medication 2 MILLIGRAM(S): at 14:43

## 2022-12-31 RX ADMIN — PANTOPRAZOLE SODIUM 40 MILLIGRAM(S): 20 TABLET, DELAYED RELEASE ORAL at 05:43

## 2022-12-31 RX ADMIN — SENNA PLUS 2 TABLET(S): 8.6 TABLET ORAL at 22:05

## 2022-12-31 RX ADMIN — Medication 1000 MILLIGRAM(S): at 06:37

## 2022-12-31 RX ADMIN — GABAPENTIN 800 MILLIGRAM(S): 400 CAPSULE ORAL at 14:43

## 2022-12-31 RX ADMIN — Medication 1000 MILLIGRAM(S): at 15:40

## 2022-12-31 RX ADMIN — OXYCODONE HYDROCHLORIDE 10 MILLIGRAM(S): 5 TABLET ORAL at 19:15

## 2022-12-31 RX ADMIN — SODIUM CHLORIDE 2 GRAM(S): 9 INJECTION INTRAMUSCULAR; INTRAVENOUS; SUBCUTANEOUS at 22:59

## 2022-12-31 RX ADMIN — Medication 1000 MILLIGRAM(S): at 22:04

## 2022-12-31 RX ADMIN — Medication 1 MILLIGRAM(S): at 22:06

## 2022-12-31 NOTE — PROGRESS NOTE ADULT - SUBJECTIVE AND OBJECTIVE BOX
HPI:  53 yo male with history of hiv on haart undetectable vl, here with back pain. Reports progressive pain lower back, radiating down both legs, more on right, x several years but now has acute severe worsening. Associated with numbness and feeling of pressure, more on right. Denies trauma. Patient has severe lumbar spinal stenosis with cauda equina compression, and disc herniations.  Patient admits to severe back pain that radiates primarily to RLE. Pain begins at midline lower back travels to the buttocks, R. thigh circumferentially and down to the toes.  He got multiple spinal injections int he past  with mediocre results.  He is on the schedule for surgry tomorrow.: L3-4, L4-5 laminectomy, foraminotomy facetectomy.    OVERNIGHT: ASHWIN o/n, neuro stable. Pain better controlled on standing valium    Hospital course:   12/28: Preop OR tomorrow lumbar laminectomy, facetectomy, foraminotomy L3-4, L4-5  12/29: POD 0. emesis o/n, given zofran. Neuro stable. Trops and EKG ordered for nonspecific changes in inferior leads.   12/30: POD1. ASHWIN o/n, neurostable. One time 0.5mg dilaudid PRN given for breakthrough pain.   12/31: POD 2. ASHWIN o/n, neurostable. On standing valium. Pain better controlled. +HMV.       Vital Signs Last 24 Hrs  T(C): 36.9 (30 Dec 2022 21:43), Max: 37.2 (30 Dec 2022 16:09)  T(F): 98.4 (30 Dec 2022 21:43), Max: 99 (30 Dec 2022 16:09)  HR: 68 (30 Dec 2022 21:43) (56 - 80)  BP: 111/68 (30 Dec 2022 21:43) (97/64 - 125/80)  BP(mean): --  RR: 17 (30 Dec 2022 21:43) (16 - 18)  SpO2: 98% (30 Dec 2022 21:43) (97% - 98%)    Parameters below as of 30 Dec 2022 21:43  Patient On (Oxygen Delivery Method): room air        I&O's Summary    29 Dec 2022 07:01  -  30 Dec 2022 07:00  --------------------------------------------------------  IN: 586.3 mL / OUT: 2130 mL / NET: -1543.7 mL    30 Dec 2022 07:01  -  31 Dec 2022 01:17  --------------------------------------------------------  IN: 0 mL / OUT: 565 mL / NET: -565 mL        PHYSICAL EXAM:  Constitutional: NAD, well groomed, well nourished  Respiratory: breathing non-labored, symmetrical chest wall movement  Cardiovascuar: RRR, no murmurs  Gastrointestinal: abdomen soft, non tender  Genitourinary: exam deffered  Neurological:  AAOX3. Face symmetric, Verbal function intact, speech clear  Cranial Nerves: II-XII intact  Motor: 5/5 power in b/l upper extremities and LLE 5/5, RLE KE 4+/5 limited secondary to pain o/w full strength.   Sensation: intact to light touch in all extremities  Extremities: distal pulses 2+ x4    WOUND/DRAINS: Posterior HMV x 1 to self suction      TUBES/LINES:  [] Guzmán  [] A-line  [] Lumbar Drain  [] Wound Drains  [] NGT   [] EVD   [] CVC  [] Other      DIET:  [] NPO  [x] Mechanical  [] Tube feeds    LABS:                        12.5   11.89 )-----------( 192      ( 30 Dec 2022 06:01 )             36.5     12-30    133<L>  |  100  |  14  ----------------------------<  109<H>  4.5   |  27  |  0.77    Ca    9.0      30 Dec 2022 06:01  Phos  3.4     12-30  Mg     1.8     12-30              CAPILLARY BLOOD GLUCOSE          Drug Levels: [] N/A    CSF Analysis: [] N/A      Allergies    No Known Allergies    Intolerances        Home Medications:  gabapentin 800 mg oral tablet: 1 tab(s) orally 3 times a day (28 Dec 2022 10:02)      MEDICATIONS:  MEDICATIONS  (STANDING):  acetaminophen     Tablet .. 1000 milliGRAM(s) Oral every 8 hours  dexAMETHasone     Tablet 2 milliGRAM(s) Oral every 8 hours  dexAMETHasone     Tablet 1 milliGRAM(s) Oral every 8 hours  dexAMETHasone     Tablet   Oral   diazepam    Tablet 5 milliGRAM(s) Oral every 8 hours  emtricitabine 200 mG/rilpivirine 25 mG/tenofovir alafenamide 25 mG (ODEFSEY) Tablet 1 Tablet(s) Oral daily  gabapentin 800 milliGRAM(s) Oral three times a day  multivitamin 1 Tablet(s) Oral daily  ondansetron   Disintegrating Tablet 4 milliGRAM(s) Oral every 6 hours  pantoprazole    Tablet 40 milliGRAM(s) Oral before breakfast  polyethylene glycol 3350 17 Gram(s) Oral daily  senna 2 Tablet(s) Oral at bedtime    MEDICATIONS  (PRN):  oxyCODONE    IR 5 milliGRAM(s) Oral every 4 hours PRN Moderate Pain (4 - 6)  oxyCODONE    IR 10 milliGRAM(s) Oral every 4 hours PRN Severe Pain (7 - 10)      CULTURES:      RADIOLOGY & ADDITIONAL TESTS:      ASSESSMENT:  53 yo M PMH HIV (on HAART, undetectable VL) here with LBP now s/p L3-5 laminectomies and foraminotomies (12/29/22)    Plan:  Neuro  -neuro and vital checks q4  -post op ERAS  -HMV x 1 to self suction   -dex taper to off   -no post op imaging required     Cardio  -normotensive    Pulm  -RA  -IS    GI  -ADAT  -bowel regimen    Renal  -IVL  -F/u Na for hyponatremia    Heme  -SCDs, SQL on hold     Endo  -no active issues    ID  -afebrile  -cont home HIV medication     Regional status, full code, PT/OT rec home no needs    D/w Dr. Contreras     Assessment: present when checked     [] GCS   E   V   M     Heart Failure: [] Acute, [] acute on chronic, [] chronic   Heart Failure: [] Diastolic (HFpEF), [] Systolic (HRrEF), [] Combined (HFpEF and HFrEF), [] RHF, [] Pulm HTN, [] Other     [] ALAINA, [] ATN, [] AIN, [] other   [] CKD1, [] CKD2, [] CKD3, [] CKD4, [] CKD5, [] ESRD     Encephalopathy: [] Metabolic, [] Hepatic, [] Toxic, [] Neurological, [] Other     Abnormal Nutritional Status: [] malnutrition (see nutrition note), []underweight: BMI <19, [] morbid obesity: BMI >40, [] Cachexia     [] Sepsis   [] Hypovolemic shock, [] Cardiogenic shock, [] Hemorrhagic shock, [] Neurogenic shock   [] Acute respiratory failure   [] Cerebral edema, [] Brain compression / herniation   [] Functional quadriplegia   [] Acute blood loss anemia

## 2022-12-31 NOTE — PROGRESS NOTE ADULT - SUBJECTIVE AND OBJECTIVE BOX
Patient was seen and examined at bedside. Case discuss with resident. Pt reports that he is only having discomfort at the incision site.     OBJECTIVE:  Vital Signs Last 24 Hrs  T(C): 36.8 (31 Dec 2022 09:09), Max: 37.2 (30 Dec 2022 16:09)  T(F): 98.2 (31 Dec 2022 09:09), Max: 99 (30 Dec 2022 16:09)  HR: 58 (31 Dec 2022 09:09) (54 - 68)  BP: 108/65 (31 Dec 2022 09:09) (104/66 - 111/68)  BP(mean): --  RR: 17 (31 Dec 2022 09:09) (16 - 18)  SpO2: 97% (31 Dec 2022 09:09) (97% - 98%)    Parameters below as of 31 Dec 2022 09:09  Patient On (Oxygen Delivery Method): room air    PHYSICAL EXAM:  Gen: NAD laying in bed  CV: RRR, +S1/S2, no mumur  Pulm: CTA b/l no wheezing or crackles   Abd: soft, NTND + BS no rebound or guarding   + drain in place     LABS:                        12.5   14.03 )-----------( 195      ( 31 Dec 2022 06:35 )             37.0     12-31    132<L>  |  101  |  17  ----------------------------<  122<H>  5.1   |  25  |  0.79    Ca    9.1      31 Dec 2022 06:35  Phos  3.0     12-31  Mg     1.9     12-31    MEDICATIONS  acetaminophen     Tablet .. 1000 milliGRAM(s) Oral every 8 hours  dexAMETHasone     Tablet 2 milliGRAM(s) Oral every 8 hours  dexAMETHasone     Tablet 1 milliGRAM(s) Oral every 8 hours  dexAMETHasone     Tablet   Oral   diazepam    Tablet 5 milliGRAM(s) Oral every 8 hours  emtricitabine 200 mG/rilpivirine 25 mG/tenofovir alafenamide 25 mG (ODEFSEY) Tablet 1 Tablet(s) Oral daily  gabapentin 800 milliGRAM(s) Oral three times a day  multivitamin 1 Tablet(s) Oral daily  ondansetron   Disintegrating Tablet 4 milliGRAM(s) Oral every 6 hours  oxyCODONE    IR 5 milliGRAM(s) Oral every 4 hours PRN  oxyCODONE    IR 10 milliGRAM(s) Oral every 4 hours PRN  pantoprazole    Tablet 40 milliGRAM(s) Oral before breakfast  polyethylene glycol 3350 17 Gram(s) Oral daily  senna 2 Tablet(s) Oral at bedtime      A/P: 52 male with PMHx of HIV and severe lumbar spinal stenosis admitted for  L3-4, L4-5 laminectomy, foraminotomy facetectomy on 12/29.     #Spinal stenosis s/p L3-4, L4-5 laminectomy, foraminotomy facetectomy on 12/29/2022.  - management per primary team for pain and drain  - PT/OT - home no needs    #HIV  - VLUD per patient and CD4 > 900 for years as outpatient per patient, no indication for prophylaxis  - Continue  Odefsey 100/25/25mg once daily.       #Leukocytosis  -Pt's WBC is 14 likely reactive   -Pt w/o any signs of infection  -Will continue to monitor     #Constipation  - Continue senna and Miralax while on opioids    #Dispo  -Pt to be d/c'ed when the  drain is removal likely tomorrow as per Ortho  -PT rec home no needs

## 2023-01-01 ENCOUNTER — TRANSCRIPTION ENCOUNTER (OUTPATIENT)
Age: 53
End: 2023-01-01

## 2023-01-01 VITALS
DIASTOLIC BLOOD PRESSURE: 66 MMHG | TEMPERATURE: 99 F | OXYGEN SATURATION: 98 % | RESPIRATION RATE: 16 BRPM | HEART RATE: 59 BPM | SYSTOLIC BLOOD PRESSURE: 108 MMHG

## 2023-01-01 LAB
ANION GAP SERPL CALC-SCNC: 9 MMOL/L — SIGNIFICANT CHANGE UP (ref 5–17)
BASOPHILS # BLD AUTO: 0.01 K/UL — SIGNIFICANT CHANGE UP (ref 0–0.2)
BASOPHILS NFR BLD AUTO: 0.1 % — SIGNIFICANT CHANGE UP (ref 0–2)
BUN SERPL-MCNC: 19 MG/DL — SIGNIFICANT CHANGE UP (ref 7–23)
CALCIUM SERPL-MCNC: 8.9 MG/DL — SIGNIFICANT CHANGE UP (ref 8.4–10.5)
CHLORIDE SERPL-SCNC: 102 MMOL/L — SIGNIFICANT CHANGE UP (ref 96–108)
CO2 SERPL-SCNC: 25 MMOL/L — SIGNIFICANT CHANGE UP (ref 22–31)
CREAT SERPL-MCNC: 0.82 MG/DL — SIGNIFICANT CHANGE UP (ref 0.5–1.3)
EGFR: 106 ML/MIN/1.73M2 — SIGNIFICANT CHANGE UP
EOSINOPHIL # BLD AUTO: 0.02 K/UL — SIGNIFICANT CHANGE UP (ref 0–0.5)
EOSINOPHIL NFR BLD AUTO: 0.2 % — SIGNIFICANT CHANGE UP (ref 0–6)
GLUCOSE SERPL-MCNC: 104 MG/DL — HIGH (ref 70–99)
HCT VFR BLD CALC: 39 % — SIGNIFICANT CHANGE UP (ref 39–50)
HGB BLD-MCNC: 12.9 G/DL — LOW (ref 13–17)
IMM GRANULOCYTES NFR BLD AUTO: 0.5 % — SIGNIFICANT CHANGE UP (ref 0–0.9)
LYMPHOCYTES # BLD AUTO: 2.43 K/UL — SIGNIFICANT CHANGE UP (ref 1–3.3)
LYMPHOCYTES # BLD AUTO: 23.9 % — SIGNIFICANT CHANGE UP (ref 13–44)
MAGNESIUM SERPL-MCNC: 1.7 MG/DL — SIGNIFICANT CHANGE UP (ref 1.6–2.6)
MCHC RBC-ENTMCNC: 33.1 GM/DL — SIGNIFICANT CHANGE UP (ref 32–36)
MCHC RBC-ENTMCNC: 33.8 PG — SIGNIFICANT CHANGE UP (ref 27–34)
MCV RBC AUTO: 102.1 FL — HIGH (ref 80–100)
MONOCYTES # BLD AUTO: 1.03 K/UL — HIGH (ref 0–0.9)
MONOCYTES NFR BLD AUTO: 10.1 % — SIGNIFICANT CHANGE UP (ref 2–14)
NEUTROPHILS # BLD AUTO: 6.64 K/UL — SIGNIFICANT CHANGE UP (ref 1.8–7.4)
NEUTROPHILS NFR BLD AUTO: 65.2 % — SIGNIFICANT CHANGE UP (ref 43–77)
NRBC # BLD: 0 /100 WBCS — SIGNIFICANT CHANGE UP (ref 0–0)
PHOSPHATE SERPL-MCNC: 3.1 MG/DL — SIGNIFICANT CHANGE UP (ref 2.5–4.5)
PLATELET # BLD AUTO: 193 K/UL — SIGNIFICANT CHANGE UP (ref 150–400)
POTASSIUM SERPL-MCNC: 4.6 MMOL/L — SIGNIFICANT CHANGE UP (ref 3.5–5.3)
POTASSIUM SERPL-SCNC: 4.6 MMOL/L — SIGNIFICANT CHANGE UP (ref 3.5–5.3)
RBC # BLD: 3.82 M/UL — LOW (ref 4.2–5.8)
RBC # FLD: 12.5 % — SIGNIFICANT CHANGE UP (ref 10.3–14.5)
SODIUM SERPL-SCNC: 136 MMOL/L — SIGNIFICANT CHANGE UP (ref 135–145)
WBC # BLD: 10.18 K/UL — SIGNIFICANT CHANGE UP (ref 3.8–10.5)
WBC # FLD AUTO: 10.18 K/UL — SIGNIFICANT CHANGE UP (ref 3.8–10.5)

## 2023-01-01 PROCEDURE — 82550 ASSAY OF CK (CPK): CPT

## 2023-01-01 PROCEDURE — 87635 SARS-COV-2 COVID-19 AMP PRB: CPT

## 2023-01-01 PROCEDURE — 71046 X-RAY EXAM CHEST 2 VIEWS: CPT

## 2023-01-01 PROCEDURE — 36415 COLL VENOUS BLD VENIPUNCTURE: CPT

## 2023-01-01 PROCEDURE — 81003 URINALYSIS AUTO W/O SCOPE: CPT

## 2023-01-01 PROCEDURE — 85025 COMPLETE CBC W/AUTO DIFF WBC: CPT

## 2023-01-01 PROCEDURE — 99232 SBSQ HOSP IP/OBS MODERATE 35: CPT

## 2023-01-01 PROCEDURE — 86850 RBC ANTIBODY SCREEN: CPT

## 2023-01-01 PROCEDURE — 82553 CREATINE MB FRACTION: CPT

## 2023-01-01 PROCEDURE — C1889: CPT

## 2023-01-01 PROCEDURE — 85610 PROTHROMBIN TIME: CPT

## 2023-01-01 PROCEDURE — 83935 ASSAY OF URINE OSMOLALITY: CPT

## 2023-01-01 PROCEDURE — 83735 ASSAY OF MAGNESIUM: CPT

## 2023-01-01 PROCEDURE — 86900 BLOOD TYPING SEROLOGIC ABO: CPT

## 2023-01-01 PROCEDURE — 84100 ASSAY OF PHOSPHORUS: CPT

## 2023-01-01 PROCEDURE — 80048 BASIC METABOLIC PNL TOTAL CA: CPT

## 2023-01-01 PROCEDURE — 85730 THROMBOPLASTIN TIME PARTIAL: CPT

## 2023-01-01 PROCEDURE — 86901 BLOOD TYPING SEROLOGIC RH(D): CPT

## 2023-01-01 PROCEDURE — 76000 FLUOROSCOPY <1 HR PHYS/QHP: CPT

## 2023-01-01 PROCEDURE — 97116 GAIT TRAINING THERAPY: CPT

## 2023-01-01 PROCEDURE — 97161 PT EVAL LOW COMPLEX 20 MIN: CPT

## 2023-01-01 PROCEDURE — 82570 ASSAY OF URINE CREATININE: CPT

## 2023-01-01 PROCEDURE — 85027 COMPLETE CBC AUTOMATED: CPT

## 2023-01-01 PROCEDURE — 84484 ASSAY OF TROPONIN QUANT: CPT

## 2023-01-01 PROCEDURE — 99285 EMERGENCY DEPT VISIT HI MDM: CPT

## 2023-01-01 RX ORDER — POLYETHYLENE GLYCOL 3350 17 G/17G
17 POWDER, FOR SOLUTION ORAL
Qty: 0 | Refills: 0 | DISCHARGE
Start: 2023-01-01

## 2023-01-01 RX ORDER — OXYCODONE HYDROCHLORIDE 5 MG/1
1 TABLET ORAL
Qty: 12 | Refills: 0
Start: 2023-01-01 | End: 2023-01-03

## 2023-01-01 RX ORDER — GABAPENTIN 400 MG/1
1 CAPSULE ORAL
Qty: 90 | Refills: 0
Start: 2023-01-01 | End: 2023-01-30

## 2023-01-01 RX ORDER — EMTRICITABINE, RILPIVIRINE HYDROCHLORIDE, AND TENOFOVIR DISOPROXIL FUMARATE 200; 25; 300 MG/1; MG/1; MG/1
1 TABLET, FILM COATED ORAL
Qty: 30 | Refills: 0
Start: 2023-01-01 | End: 2023-01-30

## 2023-01-01 RX ORDER — SODIUM CHLORIDE 9 MG/ML
3 INJECTION INTRAMUSCULAR; INTRAVENOUS; SUBCUTANEOUS EVERY 6 HOURS
Refills: 0 | Status: DISCONTINUED | OUTPATIENT
Start: 2023-01-01 | End: 2023-01-01

## 2023-01-01 RX ORDER — MAGNESIUM SULFATE 500 MG/ML
1 VIAL (ML) INJECTION ONCE
Refills: 0 | Status: DISCONTINUED | OUTPATIENT
Start: 2023-01-01 | End: 2023-01-01

## 2023-01-01 RX ORDER — DIAZEPAM 5 MG
1 TABLET ORAL
Qty: 9 | Refills: 0
Start: 2023-01-01 | End: 2023-01-03

## 2023-01-01 RX ORDER — SODIUM CHLORIDE 9 MG/ML
2 INJECTION INTRAMUSCULAR; INTRAVENOUS; SUBCUTANEOUS EVERY 12 HOURS
Refills: 0 | Status: DISCONTINUED | OUTPATIENT
Start: 2023-01-01 | End: 2023-01-01

## 2023-01-01 RX ORDER — SENNA PLUS 8.6 MG/1
2 TABLET ORAL
Qty: 0 | Refills: 0 | DISCHARGE
Start: 2023-01-01

## 2023-01-01 RX ORDER — SODIUM CHLORIDE 9 MG/ML
2 INJECTION INTRAMUSCULAR; INTRAVENOUS; SUBCUTANEOUS
Qty: 56 | Refills: 0
Start: 2023-01-01 | End: 2023-01-14

## 2023-01-01 RX ADMIN — OXYCODONE HYDROCHLORIDE 10 MILLIGRAM(S): 5 TABLET ORAL at 10:07

## 2023-01-01 RX ADMIN — Medication 1 MILLIGRAM(S): at 06:07

## 2023-01-01 RX ADMIN — Medication 1000 MILLIGRAM(S): at 06:50

## 2023-01-01 RX ADMIN — Medication 1000 MILLIGRAM(S): at 06:07

## 2023-01-01 RX ADMIN — PANTOPRAZOLE SODIUM 40 MILLIGRAM(S): 20 TABLET, DELAYED RELEASE ORAL at 06:08

## 2023-01-01 RX ADMIN — OXYCODONE HYDROCHLORIDE 10 MILLIGRAM(S): 5 TABLET ORAL at 05:03

## 2023-01-01 RX ADMIN — OXYCODONE HYDROCHLORIDE 10 MILLIGRAM(S): 5 TABLET ORAL at 09:07

## 2023-01-01 RX ADMIN — OXYCODONE HYDROCHLORIDE 10 MILLIGRAM(S): 5 TABLET ORAL at 05:50

## 2023-01-01 RX ADMIN — GABAPENTIN 800 MILLIGRAM(S): 400 CAPSULE ORAL at 06:07

## 2023-01-01 RX ADMIN — Medication 1 TABLET(S): at 11:54

## 2023-01-01 RX ADMIN — EMTRICITABINE, RILPIVIRINE HYDROCHLORIDE, AND TENOFOVIR DISOPROXIL FUMARATE 1 TABLET(S): 200; 25; 300 TABLET, FILM COATED ORAL at 11:55

## 2023-01-01 RX ADMIN — Medication 5 MILLIGRAM(S): at 06:07

## 2023-01-01 NOTE — PROGRESS NOTE ADULT - PROVIDER SPECIALTY LIST ADULT
Hospitalist
Neurosurgery
Hospitalist
Neurosurgery
Neurosurgery
Hospitalist
Neurosurgery
Neurosurgery

## 2023-01-01 NOTE — DISCHARGE NOTE PROVIDER - HOSPITAL COURSE
HPI:   51 yo male with history of hiv on haart undetectable vl, here with back pain. Reports progressive pain lower back, radiating down both legs, more on right, x several years. Gradually worsening. Associated with numbness and feeling of pressure, more on right. Denies trauma. Reports he has spinal stenosis, disc herniation and was referred by his neurosurgeon for surgery.  Patient admits to severe back pain that radiates primarily to RLE. Pain begins at midline lower back travels to the buttocks, R. thigh circumferentially and down to the toes.  He got multiple spinal injections int he past  with mediocre results.  He is on the schedule for surgry tomorrow.: L3-4, L4-5 laminectomy, foraminotomy facetectomy.    Hospital Course:  12/28: Preop OR tomorrow lumbar laminectomy, facetectomy, foraminotomy L3-4, L4-5  12/29: POD 0. emesis o/n, given zofran. Neuro stable. Trops and EKG ordered for nonspecific changes in inferior leads.   12/30: POD1. ASHWIN o/n, neurostable. One time 0.5mg dilaudid PRN given for breakthrough pain.   12/31: POD 2. ASHWIN o/n, neurostable. On standing valium. Pain better controlled. +HMV.  1/1: POD 3. ASHWIN o/n. HMV removed and DC home today.      Patient evaluated by PT/OT who recommened: Home no needs     Hospital course c/b: No course complications     Exam on day of discharge:   Constitutional: NAD  Respiratory: breathing non-labored, symmetrical chest wall movement  Cardiovascuar: RRR, no murmurs  Gastrointestinal: abdomen soft, non tender  Genitourinary: exam deffered  Neurological:  AAOX3. Face symmetric, Verbal function intact, speech clear  Cranial Nerves: II-XII intact  Motor: 5/5 power in LUE and LLE, RUE/RLE 4+/5 limited secondary to pain o/w full strength.   Sensation: intact to light touch in all extremities  Extremities: distal pulses 2+ x4    Checklist:   - Obtained follow up appointment from NP - Sunday   - post op imaging completed  - Neurologically stable for discharge  - Vitals stable for discharge   - Afebrile for discharge  - WBC is stable   - Sodium level is normal - 136   - Pain is adequately controlled with tylenol, gabapentin, valium, and prn oxycodone   - Pt has rolling walker

## 2023-01-01 NOTE — DISCHARGE NOTE PROVIDER - CARE PROVIDER_API CALL
Byron Contreras)  Neurosurgery  [address] [place] [address]  Phone: [phone]  Fax: [phone]  Established Patient  Follow Up Time:

## 2023-01-01 NOTE — DISCHARGE NOTE PROVIDER - NSDCFUADDAPPT_GEN_ALL_CORE_FT
Please follow up with Dr. Contreras (Neurosurgeon) within 1-2 weeks of discharge. Call the office for an appointment.

## 2023-01-01 NOTE — PROGRESS NOTE ADULT - REASON FOR ADMISSION
lumbar stenosis for surgery on 12/29/2022.

## 2023-01-01 NOTE — DISCHARGE NOTE NURSING/CASE MANAGEMENT/SOCIAL WORK - NSDCPEFALRISK_GEN_ALL_CORE
For information on Fall & Injury Prevention, visit: [email or web address] OR  [email or web address] OR  https://www.cdc.gov/steadi/patient.html

## 2023-01-01 NOTE — PROGRESS NOTE ADULT - SUBJECTIVE AND OBJECTIVE BOX
Patient was seen and examined at bedside. Case discuss with resident. Pt no longer having pain at incision site. Pt's HMV drain was removed this morning.     OBJECTIVE:  Vital Signs Last 24 Hrs  T(C): 37.3 (2023 08:45), Max: 37.3 (2023 08:45)  T(F): 99.1 (2023 08:45), Max: 99.1 (2023 08:45)  HR: 59 (2023 08:45) (55 - 69)  BP: 108/66 (2023 08:45) (102/63 - 108/66)  BP(mean): 90 (2023 08:45) (90 - 90)  RR: 16 (2023 08:45) (16 - 18)  SpO2: 98% (2023 08:45) (95% - 98%)    Parameters below as of 2023 08:45  Patient On (Oxygen Delivery Method): room air      PHYSICAL EXAM:  Gen: NAD laying in bed  CV: RRR, +S1/S2, no mumur  Pulm: CTA b/l no wheezing or crackles         LABS:                        12.9   10.18 )-----------( 193      ( 2023 08:28 )             39.0         136  |  102  |  19  ----------------------------<  104<H>  4.6   |  25  |  0.82    Ca    8.9      2023 08:28  Phos  3.1       Mg     1.7           Urinalysis Basic - ( 31 Dec 2022 19:56 )  Color: x / Appearance: x / S.025 / pH: x  Gluc: x / Ketone: x  / Bili: x / Urobili: x   Blood: x / Protein: x / Nitrite: x   Leuk Esterase: x / RBC: x / WBC x   Sq Epi: x / Non Sq Epi: x / Bacteria: x      MEDICATIONS   acetaminophen     Tablet .. 1000 milliGRAM(s) Oral every 8 hours  dexAMETHasone     Tablet 1 milliGRAM(s) Oral every 8 hours  diazepam    Tablet 5 milliGRAM(s) Oral every 8 hours  emtricitabine 200 mG/rilpivirine 25 mG/tenofovir alafenamide 25 mG (ODEFSEY) Tablet 1 Tablet(s) Oral daily  gabapentin 800 milliGRAM(s) Oral three times a day  multivitamin 1 Tablet(s) Oral daily  oxyCODONE    IR 5 milliGRAM(s) Oral every 4 hours PRN  oxyCODONE    IR 10 milliGRAM(s) Oral every 4 hours PRN  pantoprazole    Tablet 40 milliGRAM(s) Oral before breakfast  polyethylene glycol 3350 17 Gram(s) Oral daily  senna 2 Tablet(s) Oral at bedtime  sodium chloride 2 Gram(s) Oral every 12 hours      A/P: 52 male with PMHx of HIV and severe lumbar spinal stenosis admitted for  L3-4, L4-5 laminectomy, foraminotomy facetectomy on .     #Spinal stenosis s/p L3-4, L4-5 laminectomy, foraminotomy facetectomy on 2022.  - Pt was seen by PT and recommeded for home no needs  -Pt's HMV drain was removed this morning.     #HIV  - VLUD per patient and CD4 > 900 for years as outpatient per patient, no indication for prophylaxis  - Continue  Odefsey 100/25/25mg once daily.       #Leukocytosis  -Pt's WBC is improved to 10  likely reactive   -Pt w/o any signs of infection  -Will continue to monitor     #Constipation  - Continue senna and Miralax while on opioids    #Dispo  -Pt for discharge today.

## 2023-01-01 NOTE — DISCHARGE NOTE PROVIDER - NSDCFUADDINST_GEN_ALL_CORE_FT
Neurosurgery follow up appointment date/time:  - are staples/sutures in place?  - what day should staples/sutures be removed (POD 10-14)?  - please call the office to confirm appointment: [phone]     Wound Care:  - can patient shower?  - does dressing need to be changed/removed?    Devices:  - does patient need collar or brace?  - does collar/brace need to be worn at all times or just when OOB?    Drains/Lines:  - PICC in place? ID follow up? (Paper Rx for: antibiotics, heparin flush, weekly lab draws)  - SHIREEN in place? Management?  - may in place? Management/Urology follow up?     Activity:  - fatigue is common after surgery, rest if you feel tired   - no bending, lifting, twisting or heavy lifting   - walking is recommended, ambulate as tolerated  - you may shower when you get home, keep your incision dry  - no bathing   - no driving within 24 hours of anesthesia or while taking prescription pain medications   - keep hydrated, drink plenty of water   - skullbase precautions: no nose blowing, sneeze with mouth open, no drinking out of a straw, no straining      Inpatient consults:  - final recommendations  - you will need follow up with....    Please also follow up with your primary care doctor.     Pain Expectations:  - pain after surgery is expected  - please take pain meds as prescribed     Medications:  - changes to home meds (ex. AED's)?  - new meds?  - pain meds?  - when can antiplatelets or antocoagulants be restarted?  - were adverse affects of meds discussed with patients?   - pain medications can cause constipation, you should eat a high fiber diet and may take a stool softener while on pain meds   - Avoid taking Advil (ibuprofen), Motrin (naproxen), or Aspirin for pain as they can cause bleeding     Call the office or come to ED if:  - wound has drainage or bleeding, increased redness or pain at incision site, neurological change, fever (>101), chills, night sweats, syncope, nausea/vomiting      Playback:  - are discharge instruction on playback?  - is a picture of the incision on playback?     WITHIN 24 HOURS OF DISCHARGE, PLEASE CONTACT NEURO PA  WITH ANY QUESTIONS OR CONCERNS: [phone]   OTHERWISE, PLEASE CALL THE OFFICE WITH ANY QUESTIONS OR CONCERNS: [phone] Neurosurgery follow up appointment date/time: Call the office for an appointment   - please call the office to confirm appointment: [phone]     Wound Care:  - You may shower. Keep incision clean and dry. Steri-strips over drain exit site will fall off over time.      Activity:  - fatigue is common after surgery, rest if you feel tired   - no bending, lifting, twisting or heavy lifting   - walking is recommended, ambulate as tolerated  - you may shower when you get home, keep your incision dry  - no bathing   - no driving within 24 hours of anesthesia or while taking prescription pain medications   - keep hydrated, drink plenty of water     Please also follow up with your primary care doctor within 1 week of discharge for sodium level monitoring.     Pain Expectations:  - pain after surgery is expected  - please take pain meds as prescribed     Medications:  - Continue tylenol 650 mg every 4-6 hours as needed for mild pain  - Continue oxycodone 5 mg (1 tab for moderate pain and 2 tabs for severe pain every 6 hours)   - Continue Gabapentin 800 mg every 8 hours   - Continue Valium 5 mg every 8 hours as needed for muscle spasm   - Continue sodium chloride tabs 2 g every 12 hours until follow up with primary care doctor who can adjust   - Continue home Odefsey for HIV   - pain medications can cause constipation, you should eat a high fiber diet and may take a stool softener while on pain meds   - Avoid taking Advil (ibuprofen), Motrin (naproxen), or Aspirin for pain as they can cause bleeding     Call the office or come to ED if:  - wound has drainage or bleeding, increased redness or pain at incision site, neurological change, fever (>101), chills, night sweats, syncope, nausea/vomiting      Playback:  - There is a copy of your discharge instructions on playback health     WITHIN 24 HOURS OF DISCHARGE, PLEASE CONTACT NEURO PA  WITH ANY QUESTIONS OR CONCERNS: [phone]   OTHERWISE, PLEASE CALL THE OFFICE WITH ANY QUESTIONS OR CONCERNS: [phone]

## 2023-01-01 NOTE — DISCHARGE NOTE PROVIDER - NSDCMRMEDTOKEN_GEN_ALL_CORE_FT
gabapentin 800 mg oral tablet: 1 tab(s) orally 3 times a day  Rolling walker: 1 application orally 3 times a day    emtricitabine/rilpivirine/tenofovir alafenamide 200 mg-25 mg-25 mg oral tablet: 1 tab(s) orally once a day  gabapentin 800 mg oral tablet: 1 tab(s) orally 3 times a day MDD:3 tabs  Multiple Vitamins oral tablet: 1 tab(s) orally once a day  oxyCODONE 5 mg oral tablet: 1 tab(s) orally every 6 hours, As Needed -Moderate Pain (4 - 6) MDD:4 tabs  polyethylene glycol 3350 oral powder for reconstitution: 17 gram(s) orally once a day  Rolling walker: 1 application orally 3 times a day   senna leaf extract oral tablet: 2 tab(s) orally once a day (at bedtime)  sodium chloride 1 g oral tablet: 2 tab(s) orally every 12 hours

## 2023-01-01 NOTE — PROCEDURE NOTE - ADDITIONAL PROCEDURE DETAILS
Drain taken off suction. Site cleaned with chlorhexidine. Anchoring suture cut. Drain pulled with no resistance. Steristrips placed at drain exit site.

## 2023-01-01 NOTE — PROGRESS NOTE ADULT - SUBJECTIVE AND OBJECTIVE BOX
HPI:  53 yo male with history of hiv on haart undetectable vl, here with back pain. Reports progressive pain lower back, radiating down both legs, more on right, x several years but now has acute severe worsening. Associated with numbness and feeling of pressure, more on right. Denies trauma. Patient has severe lumbar spinal stenosis with cauda equina compression, and disc herniations.  Patient admits to severe back pain that radiates primarily to RLE. Pain begins at midline lower back travels to the buttocks, R. thigh circumferentially and down to the toes.  He got multiple spinal injections int he past  with mediocre results.  He is on the schedule for surgry tomorrow.: L3-4, L4-5 laminectomy, foraminotomy facetectomy.     (28 Dec 2022 18:13)    INTERVAL EVENTS:  Pain controlled    HOSPITAL COURSE:  : Preop OR tomorrow lumbar laminectomy, facetectomy, foraminotomy L3-4, L4-5  : POD 0. emesis o/n, given zofran. Neuro stable. Trops and EKG ordered for nonspecific changes in inferior leads.   : POD1. ASHWIN o/n, neurostable. One time 0.5mg dilaudid PRN given for breakthrough pain.   : POD 2. ASHWIN o/n, neurostable. On standing valium. Pain better controlled. +HMV.  : POD 3. ASHWIN o/n. plan to remove HMV and DC home today.        Vital Signs Last 24 Hrs  T(C): 37.1 (31 Dec 2022 20:52), Max: 37.1 (31 Dec 2022 16:21)  T(F): 98.7 (31 Dec 2022 20:52), Max: 98.7 (31 Dec 2022 16:21)  HR: 69 (31 Dec 2022 20:52) (54 - 69)  BP: 104/66 (31 Dec 2022 20:52) (102/63 - 108/65)  BP(mean): --  RR: 18 (31 Dec 2022 20:52) (16 - 18)  SpO2: 97% (31 Dec 2022 20:52) (95% - 97%)    Parameters below as of 31 Dec 2022 20:52  Patient On (Oxygen Delivery Method): room air        I&O's Summary    30 Dec 2022 07:01  -  31 Dec 2022 07:00  --------------------------------------------------------  IN: 0 mL / OUT: 565 mL / NET: -565 mL    31 Dec 2022 07:01  -  2023 01:21  --------------------------------------------------------  IN: 600 mL / OUT: 1030 mL / NET: -430 mL      PHYSICAL EXAM:  Constitutional: NAD  Respiratory: breathing non-labored, symmetrical chest wall movement  Cardiovascuar: RRR, no murmurs  Gastrointestinal: abdomen soft, non tender  Genitourinary: exam deffered  Neurological:  AAOX3. Face symmetric, Verbal function intact, speech clear  Cranial Nerves: II-XII intact  Motor: 5/5 power in b/l upper extremities and LLE 5/5, RLE KE 4+/5 limited secondary to pain o/w full strength.   Sensation: intact to light touch in all extremities  Extremities: distal pulses 2+ x4    WOUND/DRAINS: Posterior HMV x 1 to self suction      TUBES/LINES:  [] Guzmán  [] A-line  [] Lumbar Drain  [] Wound Drains  [] NGT   [] EVD   [] CVC  [] Other      DIET:  [] NPO  [x] Mechanical  [] Tube feeds    LABS:                        12.5   14.03 )-----------( 195      ( 31 Dec 2022 06:35 )             37.0         132<L>  |  101  |  17  ----------------------------<  122<H>  5.1   |  25  |  0.79    Ca    9.1      31 Dec 2022 06:35  Phos  3.0       Mg     1.9             Urinalysis Basic - ( 31 Dec 2022 19:56 )    Color: x / Appearance: x / S.025 / pH: x  Gluc: x / Ketone: x  / Bili: x / Urobili: x   Blood: x / Protein: x / Nitrite: x   Leuk Esterase: x / RBC: x / WBC x   Sq Epi: x / Non Sq Epi: x / Bacteria: x          CAPILLARY BLOOD GLUCOSE          Drug Levels: [] N/A    CSF Analysis: [] N/A      Allergies    No Known Allergies    Intolerances      MEDICATIONS:  Antibiotics:  emtricitabine 200 mG/rilpivirine 25 mG/tenofovir alafenamide 25 mG (ODEFSEY) Tablet 1 Tablet(s) Oral daily    Neuro:  acetaminophen     Tablet .. 1000 milliGRAM(s) Oral every 8 hours  diazepam    Tablet 5 milliGRAM(s) Oral every 8 hours  gabapentin 800 milliGRAM(s) Oral three times a day  oxyCODONE    IR 5 milliGRAM(s) Oral every 4 hours PRN  oxyCODONE    IR 10 milliGRAM(s) Oral every 4 hours PRN    Anticoagulation:    OTHER:  dexAMETHasone     Tablet 1 milliGRAM(s) Oral every 8 hours  dexAMETHasone     Tablet   Oral   pantoprazole    Tablet 40 milliGRAM(s) Oral before breakfast  polyethylene glycol 3350 17 Gram(s) Oral daily  senna 2 Tablet(s) Oral at bedtime    IVF:  multivitamin 1 Tablet(s) Oral daily  sodium chloride 2 Gram(s) Oral every 12 hours    CULTURES:    RADIOLOGY & ADDITIONAL TESTS:      ASSESSMENT:  Assessment: 53 yo M PMH HIV (on HAART, undetectable VL) here with LBP now s/p L3-5 laminectomies and foraminotomies (22)    Plan:  Neuro  -neuro and vital checks q4  -post op ERAS  -HMV x 1 to self suction   -dex taper to off   -no post op imaging required     Cardio  -normotensive    Pulm  -RA  -IS    GI  -reg diet  -bowel regimen    Renal  -IVL  -F/u Na for hyponatremia  -Na tabs 2g BID    Heme  -SCDs, SQL on hold     Endo  -no active issues    ID  -afebrile  -cont home HIV medication     Regional status, full code, PT/OT rec home no needs    D/w Dr. Contreras

## 2023-01-01 NOTE — DISCHARGE NOTE PROVIDER - NSDCCPCAREPLAN_GEN_ALL_CORE_FT
PRINCIPAL DISCHARGE DIAGNOSIS  Diagnosis: Lumbar radiculopathy  Assessment and Plan of Treatment:       SECONDARY DISCHARGE DIAGNOSES  Diagnosis: HIV disease  Assessment and Plan of Treatment:      PRINCIPAL DISCHARGE DIAGNOSIS  Diagnosis: Lumbar radiculopathy  Assessment and Plan of Treatment:       SECONDARY DISCHARGE DIAGNOSES  Diagnosis: H/O spinal stenosis  Assessment and Plan of Treatment:     Diagnosis: HIV disease  Assessment and Plan of Treatment:     Diagnosis: Anemia due to acute blood loss  Assessment and Plan of Treatment:     Diagnosis: Constipation  Assessment and Plan of Treatment:

## 2023-01-01 NOTE — DISCHARGE NOTE NURSING/CASE MANAGEMENT/SOCIAL WORK - PATIENT PORTAL LINK FT
You can access the FollowMyHealth Patient Portal offered by [facility] by registering at the following website: [email or web address]. By joining [facility]’s FollowMyHealth portal, you will also be able to view your health information using other applications (apps) compatible with our system.

## 2023-01-03 ENCOUNTER — NON-APPOINTMENT (OUTPATIENT)
Age: 53
End: 2023-01-03

## 2023-01-03 PROBLEM — B20 HUMAN IMMUNODEFICIENCY VIRUS [HIV] DISEASE: Chronic | Status: ACTIVE | Noted: 2022-12-28

## 2023-01-03 RX ORDER — ACETAMINOPHEN 500 MG/1
500 TABLET ORAL EVERY 6 HOURS
Qty: 90 | Refills: 0 | Status: ACTIVE | COMMUNITY
Start: 2023-01-03 | End: 1900-01-01

## 2023-01-03 RX ORDER — DOCUSATE SODIUM 100 MG/1
100 CAPSULE, LIQUID FILLED ORAL 3 TIMES DAILY
Qty: 42 | Refills: 0 | Status: ACTIVE | COMMUNITY
Start: 2023-01-03 | End: 1900-01-01

## 2023-01-03 RX ORDER — GABAPENTIN 800 MG/1
800 TABLET, COATED ORAL 3 TIMES DAILY
Qty: 42 | Refills: 0 | Status: ACTIVE | COMMUNITY
Start: 2023-01-03 | End: 1900-01-01

## 2023-01-03 RX ORDER — TRAMADOL HYDROCHLORIDE 50 MG/1
50 TABLET, COATED ORAL
Qty: 21 | Refills: 0 | Status: ACTIVE | COMMUNITY
Start: 2023-01-03 | End: 1900-01-01

## 2023-01-05 DIAGNOSIS — M43.16 SPONDYLOLISTHESIS, LUMBAR REGION: ICD-10-CM

## 2023-01-05 DIAGNOSIS — D62 ACUTE POSTHEMORRHAGIC ANEMIA: ICD-10-CM

## 2023-01-05 DIAGNOSIS — Z20.822 CONTACT WITH AND (SUSPECTED) EXPOSURE TO COVID-19: ICD-10-CM

## 2023-01-05 DIAGNOSIS — M48.062 SPINAL STENOSIS, LUMBAR REGION WITH NEUROGENIC CLAUDICATION: ICD-10-CM

## 2023-01-05 DIAGNOSIS — M47.26 OTHER SPONDYLOSIS WITH RADICULOPATHY, LUMBAR REGION: ICD-10-CM

## 2023-01-05 DIAGNOSIS — G83.4 CAUDA EQUINA SYNDROME: ICD-10-CM

## 2023-01-05 DIAGNOSIS — K59.00 CONSTIPATION, UNSPECIFIED: ICD-10-CM

## 2023-01-05 DIAGNOSIS — D72.829 ELEVATED WHITE BLOOD CELL COUNT, UNSPECIFIED: ICD-10-CM

## 2023-01-05 DIAGNOSIS — E87.1 HYPO-OSMOLALITY AND HYPONATREMIA: ICD-10-CM

## 2023-01-05 DIAGNOSIS — G99.2 MYELOPATHY IN DISEASES CLASSIFIED ELSEWHERE: ICD-10-CM

## 2023-01-05 DIAGNOSIS — B20 HUMAN IMMUNODEFICIENCY VIRUS [HIV] DISEASE: ICD-10-CM

## 2023-01-12 ENCOUNTER — APPOINTMENT (OUTPATIENT)
Dept: NEUROSURGERY | Facility: CLINIC | Age: 53
End: 2023-01-12
Payer: MEDICAID

## 2023-01-12 ENCOUNTER — NON-APPOINTMENT (OUTPATIENT)
Age: 53
End: 2023-01-12

## 2023-01-12 VITALS
HEART RATE: 71 BPM | RESPIRATION RATE: 18 BRPM | WEIGHT: 147 LBS | BODY MASS INDEX: 21.77 KG/M2 | SYSTOLIC BLOOD PRESSURE: 96 MMHG | HEIGHT: 69 IN | DIASTOLIC BLOOD PRESSURE: 62 MMHG | TEMPERATURE: 98 F | OXYGEN SATURATION: 98 %

## 2023-01-12 DIAGNOSIS — Z09 ENCOUNTER FOR FOLLOW-UP EXAMINATION AFTER COMPLETED TREATMENT FOR CONDITIONS OTHER THAN MALIGNANT NEOPLASM: ICD-10-CM

## 2023-01-12 PROCEDURE — 99024 POSTOP FOLLOW-UP VISIT: CPT

## 2023-01-12 NOTE — PHYSICAL EXAM
[General Appearance - Alert] : alert [General Appearance - In No Acute Distress] : in no acute distress [Healing Well] : healing well [No Drainage] : without drainage [Normal Skin] : normal [Oriented To Time, Place, And Person] : oriented to person, place, and time [Straight-Leg Raise Test - Left] : straight leg raise of the left leg was positive [Straight-Leg Raise Test - Right] : straight leg raise of the right leg was positive [Pain] : was painful [Antalgic] : antalgic [Sclera] : the sclera and conjunctiva were normal [Outer Ear] : the ears and nose were normal in appearance [Neck Appearance] : the appearance of the neck was normal [] : no respiratory distress [Abnormal Walk] : normal gait [FreeTextEntry1] : Low Back [Able to toe walk] : the patient was not able to toe walk

## 2023-01-12 NOTE — HISTORY OF PRESENT ILLNESS
[de-identified] : 52 year old man hx HIV and lumbar stenosis who presented to the office today for second opinion for lumbar spine.\par \par Mr. Cosme states he has suffered from low back pain and sciatica for the past 3 years. He states it has become progressively worse over the past year. He reports severe low back pain radiating to bilateral hips and buttocks down legs to feet with associated numbness/tingling (RIGHT greater than LEFT). He reports subjective weakness of bilateral lower extremities. He has been under the care of pain management for the past 3 years and has had ESIs in the past. At first, he states he had relief from the ESIs for about 4-6 weeks but repeat ESIs done this year have been ineffective in alleviating pain. He has also failed trial of oral pain medications prescribed by pain management including ibuprofen, tramadol and muscle relaxers.\par \par He has participated in supervised physical therapy with no improvement in symptoms.\par \par He had MRI lumbar spine done on 9/15/22, which demonstrates L3-L4 severe left and moderate right foraminal stenosis, L4-5 severe left and right foraminal stenosis and L5-S1 disc herniation. \par \par He underwent L3-4 and L4-5 laminectomy on 12/29/22 with Dr. Byron Contreras.

## 2023-01-12 NOTE — ASSESSMENT
[FreeTextEntry1] : No Bend/Lift/Twist\par Do not lift anything more than 10 pounds\par No strenuous activity for 2 weeks after surgery\par Do NOT smoke or use nicotine products as it can prevent bony fusion and delay healing\par Shower daily; use mild soap - pat incision line dry with dry separate towel\par Do not apply lotions or ointments over incision\par No tubs, pools for 6-8 weeks\par Avoid direct sun exposure to incision site for 3-6 months\par Gradually increase activities as tolerated\par Report all s/s infection including drainage, odor, redness, fever greater than 101,\par \par Patient verbalizes agreement and understanding with plan of care.\par \par

## 2023-01-12 NOTE — REASON FOR VISIT
[de-identified] : Bilateral lumbar laminectomy, medial facetectoy, foraminotomy L3-4 and L-5 [de-identified] : 12/29/22 [de-identified] : Reports he is doing well. Reports achiness/soreness and occasional incisional pain. He is no longer taking tramadol. He remains on gabapentin 800 mg tid.\par Reports mild residual RIGHT lateral thigh radiating pain and numbness/tingling RIGHT foot, which has improved postoperatively.\par Denies any signs of postop wound infection which could include but is not limited to redness/swelling/purulent drainage\par Denies CP/SOB/unilateral leg edema. Denies headaches, nausea, vomiting, dizziness, weakness. \par He is slowly introducing preop activities\par

## 2023-01-30 ENCOUNTER — APPOINTMENT (OUTPATIENT)
Dept: NEUROSURGERY | Facility: CLINIC | Age: 53
End: 2023-01-30
Payer: MEDICAID

## 2023-02-07 ENCOUNTER — NON-APPOINTMENT (OUTPATIENT)
Age: 53
End: 2023-02-07

## 2023-02-07 ENCOUNTER — APPOINTMENT (OUTPATIENT)
Dept: NEUROSURGERY | Facility: CLINIC | Age: 53
End: 2023-02-07
Payer: MEDICAID

## 2023-02-07 VITALS
HEART RATE: 68 BPM | RESPIRATION RATE: 18 BRPM | HEIGHT: 69 IN | DIASTOLIC BLOOD PRESSURE: 77 MMHG | SYSTOLIC BLOOD PRESSURE: 113 MMHG | TEMPERATURE: 97 F | BODY MASS INDEX: 21.77 KG/M2 | WEIGHT: 147 LBS | OXYGEN SATURATION: 98 %

## 2023-02-07 PROCEDURE — 99024 POSTOP FOLLOW-UP VISIT: CPT

## 2023-02-07 NOTE — PHYSICAL EXAM
[General Appearance - Alert] : alert [General Appearance - In No Acute Distress] : in no acute distress [Well-Healed] : well-healed [Intact] : intact [No Drainage] : without drainage [Normal Skin] : normal [Oriented To Time, Place, And Person] : oriented to person, place, and time [No CVA Tenderness] : no ~M costovertebral angle tenderness [No Spinal Tenderness] : no spinal tenderness

## 2023-02-09 NOTE — REASON FOR VISIT
[de-identified] : Bilateral lumbar laminectomy, medial facetectoy, foraminotomy L3-4 and L-5 [de-identified] : 12/29/22 [de-identified] : Reports he is doing well. Reports achiness/soreness and occasional incisional pain. He is no longer taking tramadol. He remains on gabapentin 800 mg tid.\par Reports mild residual RIGHT lateral thigh radiating pain and numbness/tingling RIGHT foot, which has improved postoperatively.\par Denies any signs of postop wound infection which could include but is not limited to redness/swelling/purulent drainage\par Denies CP/SOB/unilateral leg edema. Denies headaches, nausea, vomiting, dizziness, weakness. \par He is slowly introducing preop activities\par

## 2023-02-09 NOTE — REVIEW OF SYSTEMS
[Feeling Poorly] : not feeling poorly [Feeling Tired] : not feeling tired [Anxiety] : no anxiety [Depression] : no depression [Arm Weakness] : no arm weakness [Leg Weakness] : no leg weakness [Numbness] : no numbness [Tingling] : no tingling [Seizures] : no convulsions [Cluster Headache] : no cluster headache [Difficulty Walking] : no difficulty walking [Eyesight Problems] : no eyesight problems [Joint Swelling] : no joint swelling

## 2023-02-09 NOTE — ASSESSMENT
[FreeTextEntry1] : Patient instructed to start physical therapy at a location close to him- script provided\par Continue gabapentin 800 mg tid\par Provided patient documentation for work excuse for 6 weeks\par RTC in 3 months for follow-up (May '23)\par \par \par I, Dr. Contreras, personally performed the evaluation and management (E/M) services for this established patient who presents today with (a) new problem(s)/exacerbation of (an) existing condition(s).  That E/M includes conducting the examination, assessing all new/exacerbated conditions, and establishing a new plan of care.  Today, my ACP, Zayra Reid, was here to observe my evaluation and management services for this new problem/exacerbated condition to be followed going forward.\par

## 2023-02-09 NOTE — HISTORY OF PRESENT ILLNESS
[de-identified] : 52 year old man hx HIV and lumbar stenosis who presented to the office today for second opinion for lumbar spine.\par \par Mr. Cosme states he has suffered from low back pain and sciatica for the past 3 years. He states it has become progressively worse over the past year. He reports severe low back pain radiating to bilateral hips and buttocks down legs to feet with associated numbness/tingling (RIGHT greater than LEFT). He reports subjective weakness of bilateral lower extremities. He has been under the care of pain management for the past 3 years and has had ESIs in the past. At first, he states he had relief from the ESIs for about 4-6 weeks but repeat ESIs done this year have been ineffective in alleviating pain. He has also failed trial of oral pain medications prescribed by pain management including ibuprofen, tramadol and muscle relaxers.\par \par He has participated in supervised physical therapy with no improvement in symptoms.\par \par He had MRI lumbar spine done on 9/15/22, which demonstrates L3-L4 severe left and moderate right foraminal stenosis, L4-5 severe left and right foraminal stenosis and L5-S1 disc herniation. \par \par He underwent L3-4 and L4-5 laminectomy on 12/29/22 with Dr. Byron Contreras.\par \par He is recovering well at home and endorses some tightness and tingling at the site. He denies any pain. His incision site is well healed without any tenderness. He has not started PT yet but is planning on starting that next week. He is currently taking gabapentin 800 mg tid.

## 2023-09-19 ENCOUNTER — NON-APPOINTMENT (OUTPATIENT)
Age: 53
End: 2023-09-19

## 2023-10-16 ENCOUNTER — APPOINTMENT (OUTPATIENT)
Dept: NEUROSURGERY | Facility: CLINIC | Age: 53
End: 2023-10-16
Payer: MEDICAID

## 2023-10-16 VITALS
HEART RATE: 93 BPM | RESPIRATION RATE: 18 BRPM | DIASTOLIC BLOOD PRESSURE: 71 MMHG | HEIGHT: 68 IN | TEMPERATURE: 98.1 F | OXYGEN SATURATION: 98 % | BODY MASS INDEX: 22.13 KG/M2 | SYSTOLIC BLOOD PRESSURE: 113 MMHG | WEIGHT: 146 LBS

## 2023-10-16 PROCEDURE — 99214 OFFICE O/P EST MOD 30 MIN: CPT

## 2024-02-13 ENCOUNTER — APPOINTMENT (OUTPATIENT)
Dept: NEUROSURGERY | Facility: CLINIC | Age: 54
End: 2024-02-13
Payer: MEDICAID

## 2024-02-13 PROCEDURE — 99214 OFFICE O/P EST MOD 30 MIN: CPT

## 2024-02-13 NOTE — DATA REVIEWED
[de-identified] : 	 Exam requested by: CURTIS RASHID  [address] [place] [facility] [address] SITE PERFORMED: [facility] PHONE: [phone] Patient: TAQUERIA NGUYEN JR YOB: 1970 Phone: [phone]  MRN: [id number] Acc: [id number] Date of Exam: 10-   EXAM:  MRI LUMBAR SPINE WITHOUT CONTRAST  HISTORY: Chronic low back and right leg pain, lumbar radiculopathy. Status post L4-L5 laminectomy in December 2022.  TECHNIQUE: Multiplanar, multi-sequential MRI of the lumbar spine was obtained on a 3T scanner using a standard protocol.  COMPARISON:  MRI lumbar spine 9/15/2022 and CT 11/9/2022.  FINDINGS: For purposes of this dictation, the last well-formed disc space will be labeled L5-S1.  OSSEOUS STRUCTURES: Vertebral body heights are preserved. No marrow edema or destructive marrow infiltrative process.   ALIGNMENT: Stable mild lumbar S-shaped scoliosis, grade 1 anterolisthesis of 4 mm of L3 and retrolisthesis of 4 mm of L5.   SPINAL CORD AND CONUS MEDULLARIS: Normal signal.  PARASPINAL AND INTRA-ABDOMINAL SOFT TISSUES: Unremarkable.  INCLUDED THORACIC SPINE AND SACRUM: Unremarkable.  DISCS: Stable L3-S1 moderate desiccation, narrowing of the discs, and L3-L4, L5-S1 Schmorl's nodes and mild Modic type I and II changes.  FACETS: Stable moderate L3-L4 and mild L4-S1 facet arthrosis.  The following axial levels are imaged and detailed below:  L1-L2: No disc herniation or spinal canal stenosis.  No foraminal stenosis.  L2-L3: No disc herniation or spinal canal stenosis.  No foraminal stenosis.  L3-L4: Stable moderate facet arthrosis, mild ligamenta flava hypertrophy, grade 1 anterolisthesis of 4 mm and small uncovered symmetric disc bulge resulting in moderate spinal canal, foraminal stenoses and impingement of exiting left L3 nerve root.  L4-L5: Interval laminectomy with resolution of previously noted spinal canal stenosis, without residuum. Stable small symmetric disc bulge, superimposed right paracentral disc protrusion and mild facet arthrosis with moderate foraminal, right subarticular recess stenoses and impingement of bilateral exiting L4 and transversing right S1 nerve roots.  L5-S1: Stable mild facet arthrosis, ligamenta flava hypertrophy, moderate disc degeneration, 3 mm retrolisthesis and moderate right asymmetric disc bulge osteophyte complex resulting in mild spinal canal, right subarticular recess, left foraminal, severe right foraminal stenoses and impingement of exiting right L5 and transversing right S1 nerve roots.  IMPRESSION: L4-5 interval laminectomy with resolution of previously noted spinal canal stenosis, without residuum.  Otherwise, stable L3-S1 moderate discs degeneration, facet arthrosis, grade 1 listhesis at L3-L4, L5-S1 and moderate disc bulges resulting in moderate L3-L4 spinal canal, L3-S1 foraminal stenoses and impingement of left L3, bilateral L4 and right L5, S1 nerve roots. See above for detailed description of degenerative changes per levels of the lumbar spine.  Thank you for the opportunity to participate in the care of this patient.     KHADAR OLGUIN MD  - Electronically Signed: 10- 12:25 PM  Physician to Physician Direct Line is:

## 2024-02-13 NOTE — HISTORY OF PRESENT ILLNESS
[Home] : at home, [unfilled] , at the time of the visit. [Medical Office: ([facility])___] : at the medical office located in  [Verbal consent obtained from patient] : the patient, [unfilled] [de-identified] : 53 year old man hx HIV and lumbar stenosis who presented to the office today for second opinion for lumbar spine.  Mr. Cosme states he has suffered from low back pain and sciatica for the past 3 years. He states it has become progressively worse over the past year. He reports severe low back pain radiating to bilateral hips and buttocks down legs to feet with associated numbness/tingling (RIGHT greater than LEFT). He reports subjective weakness of bilateral lower extremities. He has been under the care of pain management for the past 3 years and has had ESIs in the past. At first, he states he had relief from the ESIs for about 4-6 weeks but repeat ESIs done this year have been ineffective in alleviating pain. He has also failed trial of oral pain medications prescribed by pain management including ibuprofen, tramadol and muscle relaxers.  He has participated in supervised physical therapy with no improvement in symptoms.  He had MRI lumbar spine done on 9/15/22, which demonstrates L3-L4 severe left and moderate right foraminal stenosis, L4-5 severe left and right foraminal stenosis and L5-S1 disc herniation.   He underwent L3-4 and L4-5 laminectomy on 12/29/22 with Dr. Byron Contreras.  10/16/23: Returns today due to severe low back pain. He reports occasional radiating RIGHT leg pain. Currently on gabapentin 800 mg tid. Back pain has significantly affected his quality of life. He had to leave his job due to severe pain. Pain is worse with position changes and prolonged standing. MRI lumbar spine was done on 10/12/23 and reviewed by Dr. Contreras. No surgical intervention was recommended. Recommended PT. He returns today to discuss progress.  2/13/24: Returns today due to restless legs. He also describes "feeling bugs crawling" in legs bilaterally as well as numbness/tingling in RIGHT leg.

## 2024-02-13 NOTE — ASSESSMENT
[FreeTextEntry1] : MRI lumbar spine from 10/12/23 again reviewed by Dr. Contreras with patient, which demonstrates no clear underlying etiology for new symptoms. No surgery recommended at this time. Suspect peripheral neuropathy - recommend EMG of lower extremities; will refer to Dr. Pratima Jonesi Will also refer to pain management for residual back pain - referral provided After EMG complete, RTO to review results with Dr. Contreras  Patient verbalizes agreement and understanding with plan of care.  I, Dr. Contreras, personally performed the evaluation and management (E/M) services for this established patient who presents today with (a) new problem(s)/exacerbation of (an) existing condition(s).  That E/M includes conducting the examination, assessing all new/exacerbated conditions, and establishing a new plan of care.  Today, my ACP, nAum Reed, was here to observe my evaluation and management services for this new problem/exacerbated condition to be followed going forward.

## 2024-02-13 NOTE — REASON FOR VISIT
[Follow-Up: _____] : a [unfilled] follow-up visit [FreeTextEntry1] : s/p bilateral lumbar laminectomy, medial facetectomy, foraminotomy L3-4 and L4-5  on 12/9/22

## 2024-02-26 ENCOUNTER — APPOINTMENT (OUTPATIENT)
Dept: PHYSICAL MEDICINE AND REHAB | Facility: CLINIC | Age: 54
End: 2024-02-26

## 2024-05-14 ENCOUNTER — APPOINTMENT (OUTPATIENT)
Dept: PHYSICAL MEDICINE AND REHAB | Facility: CLINIC | Age: 54
End: 2024-05-14

## 2024-06-27 ENCOUNTER — TRANSCRIPTION ENCOUNTER (OUTPATIENT)
Age: 54
End: 2024-06-27

## 2024-06-27 ENCOUNTER — APPOINTMENT (OUTPATIENT)
Dept: PHYSICAL MEDICINE AND REHAB | Facility: CLINIC | Age: 54
End: 2024-06-27
Payer: MEDICARE

## 2024-06-27 VITALS — HEIGHT: 68 IN | BODY MASS INDEX: 22.13 KG/M2 | WEIGHT: 146 LBS

## 2024-06-27 VITALS — BODY MASS INDEX: 22.13 KG/M2 | WEIGHT: 146 LBS | HEIGHT: 68 IN

## 2024-06-27 DIAGNOSIS — Z21 ASYMPTOMATIC HUMAN IMMUNODEFICIENCY VIRUS [HIV] INFECTION STATUS: ICD-10-CM

## 2024-06-27 DIAGNOSIS — G89.18 OTHER ACUTE POSTPROCEDURAL PAIN: ICD-10-CM

## 2024-06-27 DIAGNOSIS — R25.2 CRAMP AND SPASM: ICD-10-CM

## 2024-06-27 DIAGNOSIS — Z98.890 OTHER SPECIFIED POSTPROCEDURAL STATES: ICD-10-CM

## 2024-06-27 DIAGNOSIS — R20.0 ANESTHESIA OF SKIN: ICD-10-CM

## 2024-06-27 DIAGNOSIS — R20.2 ANESTHESIA OF SKIN: ICD-10-CM

## 2024-06-27 DIAGNOSIS — M54.50 LOW BACK PAIN, UNSPECIFIED: ICD-10-CM

## 2024-06-27 DIAGNOSIS — G89.4 CHRONIC PAIN SYNDROME: ICD-10-CM

## 2024-06-27 DIAGNOSIS — M48.061 SPINAL STENOSIS, LUMBAR REGION WITHOUT NEUROGENIC CLAUDICATION: ICD-10-CM

## 2024-06-27 DIAGNOSIS — M96.1 POSTLAMINECTOMY SYNDROME, NOT ELSEWHERE CLASSIFIED: ICD-10-CM

## 2024-06-27 DIAGNOSIS — R29.898 OTHER SYMPTOMS AND SIGNS INVOLVING THE MUSCULOSKELETAL SYSTEM: ICD-10-CM

## 2024-06-27 PROCEDURE — G2211 COMPLEX E/M VISIT ADD ON: CPT

## 2024-06-27 PROCEDURE — 99204 OFFICE O/P NEW MOD 45 MIN: CPT

## 2024-07-16 ENCOUNTER — TRANSCRIPTION ENCOUNTER (OUTPATIENT)
Age: 54
End: 2024-07-16

## 2024-07-17 ENCOUNTER — NON-APPOINTMENT (OUTPATIENT)
Age: 54
End: 2024-07-17

## 2024-07-17 ENCOUNTER — APPOINTMENT (OUTPATIENT)
Dept: NEUROLOGY | Facility: CLINIC | Age: 54
End: 2024-07-17
Payer: MEDICAID

## 2024-07-17 VITALS
HEIGHT: 68 IN | OXYGEN SATURATION: 95 % | TEMPERATURE: 97.3 F | BODY MASS INDEX: 21.67 KG/M2 | DIASTOLIC BLOOD PRESSURE: 71 MMHG | HEART RATE: 100 BPM | WEIGHT: 143 LBS | SYSTOLIC BLOOD PRESSURE: 116 MMHG

## 2024-07-17 DIAGNOSIS — G47.62 SLEEP RELATED LEG CRAMPS: ICD-10-CM

## 2024-07-17 DIAGNOSIS — G57.02 LESION OF SCIATIC NERVE, LEFT LOWER LIMB: ICD-10-CM

## 2024-07-17 PROCEDURE — 95910 NRV CNDJ TEST 7-8 STUDIES: CPT

## 2024-07-17 PROCEDURE — 95885 MUSC TST DONE W/NERV TST LIM: CPT | Mod: 59

## 2024-07-17 PROCEDURE — 99204 OFFICE O/P NEW MOD 45 MIN: CPT | Mod: 25

## 2024-07-17 PROCEDURE — 95886 MUSC TEST DONE W/N TEST COMP: CPT

## 2024-07-17 RX ORDER — OMEGA-3/DHA/EPA/FISH OIL 300-1000MG
400 CAPSULE ORAL AT BEDTIME
Qty: 90 | Refills: 3 | Status: ACTIVE | COMMUNITY
Start: 2024-07-17 | End: 1900-01-01

## 2024-07-18 PROBLEM — G57.01 COMMON PERONEAL NEUROPATHY OF RIGHT LOWER EXTREMITY: Status: ACTIVE | Noted: 2024-07-18

## 2024-07-18 PROBLEM — Z21 HIV POSITIVE: Status: RESOLVED | Noted: 2024-06-27 | Resolved: 2024-07-18

## 2024-07-18 PROBLEM — G57.02 COMMON PERONEAL NEUROPATHY OF LEFT LOWER EXTREMITY: Status: ACTIVE | Noted: 2024-07-18

## 2024-07-22 ENCOUNTER — APPOINTMENT (OUTPATIENT)
Dept: NEUROSURGERY | Facility: CLINIC | Age: 54
End: 2024-07-22
Payer: MEDICAID

## 2024-07-22 VITALS
OXYGEN SATURATION: 98 % | SYSTOLIC BLOOD PRESSURE: 103 MMHG | BODY MASS INDEX: 21.67 KG/M2 | HEART RATE: 81 BPM | WEIGHT: 143 LBS | DIASTOLIC BLOOD PRESSURE: 66 MMHG | HEIGHT: 68 IN

## 2024-07-22 DIAGNOSIS — Z21 ASYMPTOMATIC HUMAN IMMUNODEFICIENCY VIRUS [HIV] INFECTION STATUS: ICD-10-CM

## 2024-07-22 DIAGNOSIS — M48.061 SPINAL STENOSIS, LUMBAR REGION WITHOUT NEUROGENIC CLAUDICATION: ICD-10-CM

## 2024-07-22 DIAGNOSIS — Z87.891 PERSONAL HISTORY OF NICOTINE DEPENDENCE: ICD-10-CM

## 2024-07-22 DIAGNOSIS — Z98.890 OTHER SPECIFIED POSTPROCEDURAL STATES: ICD-10-CM

## 2024-07-22 DIAGNOSIS — G57.01 LESION OF SCIATIC NERVE, RIGHT LOWER LIMB: ICD-10-CM

## 2024-07-22 PROCEDURE — 99215 OFFICE O/P EST HI 40 MIN: CPT

## 2024-07-22 RX ORDER — BICTEGRAVIR SODIUM, EMTRICITABINE, AND TENOFOVIR ALAFENAMIDE FUMARATE 30; 120; 15 MG/1; MG/1; MG/1
TABLET ORAL
Refills: 0 | Status: ACTIVE | COMMUNITY

## 2024-07-23 NOTE — ASSESSMENT
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 I discussed this case with neuromuscular neurologist Dr. Greene who indicates that the common peronial nerve entrapment at the right knee is a significant contributor to the patient's weakness. Also, no further radiculopathy was seen on EMG suggesting that spine pathology is not the issue at this time. Given EMG findings and symptoms, surgical intervention of RIGHT common peroneal nerve decompression @ knee is recommended. Risks, benefit and alternative to the surgery was discussed with the patient. Specific risks including but not limited to persistent right leg weakness, infection, knee instability, need for re-operation, nerve injury discussed. He verbalizes understanding and would like to proceed.  PLAN - tentative surgery date on 8/7/24 - medical clearance (packet was provided today)  I, Dr. Byron Contreras, personally performed the evaluation and management (E/M) services for this established patient who presents today with (a) new problem(s)/exacerbation of (an) existing condition(s). That E/M includes conducting the clinically appropriate interval history &/or exam, assessing all new/exacerbated conditions, and establishing a new plan of care. Today, my MALACHI, Hyunchu Katerine-Gold, was here to observe my evaluation and management service for this new problem/exacerbated condition and follow the plan of care established by me going forward.

## 2024-07-23 NOTE — REASON FOR VISIT
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 No polyneuropathy or recurrent lumbar radiculopathy. Patient complains of right leg weakness and pain.

## 2024-07-23 NOTE — DATA REVIEWED
[de-identified] : 	 Exam requested by: CURTIS RASHID  [address] [facility] [address] SITE PERFORMED: [facility] PHONE: [phone] Patient: TAQUERIA NGUYEN JR YOB: 1970 Phone: [phone]  MRN: [id number] Acc: [id number] Date of Exam: 10-   EXAM:  MRI LUMBAR SPINE WITHOUT CONTRAST  HISTORY: Chronic low back and right leg pain, lumbar radiculopathy. Status post L4-L5 laminectomy in December 2022.  TECHNIQUE: Multiplanar, multi-sequential MRI of the lumbar spine was obtained on a 3T scanner using a standard protocol.  COMPARISON:  MRI lumbar spine 9/15/2022 and CT 11/9/2022.  FINDINGS: For purposes of this dictation, the last well-formed disc space will be labeled L5-S1.  OSSEOUS STRUCTURES: Vertebral body heights are preserved. No marrow edema or destructive marrow infiltrative process.   ALIGNMENT: Stable mild lumbar S-shaped scoliosis, grade 1 anterolisthesis of 4 mm of L3 and retrolisthesis of 4 mm of L5.   SPINAL CORD AND CONUS MEDULLARIS: Normal signal.  PARASPINAL AND INTRA-ABDOMINAL SOFT TISSUES: Unremarkable.  INCLUDED THORACIC SPINE AND SACRUM: Unremarkable.  DISCS: Stable L3-S1 moderate desiccation, narrowing of the discs, and L3-L4, L5-S1 Schmorl's nodes and mild Modic type I and II changes.  FACETS: Stable moderate L3-L4 and mild L4-S1 facet arthrosis.  The following axial levels are imaged and detailed below:  L1-L2: No disc herniation or spinal canal stenosis.  No foraminal stenosis.  L2-L3: No disc herniation or spinal canal stenosis.  No foraminal stenosis.  L3-L4: Stable moderate facet arthrosis, mild ligamenta flava hypertrophy, grade 1 anterolisthesis of 4 mm and small uncovered symmetric disc bulge resulting in moderate spinal canal, foraminal stenoses and impingement of exiting left L3 nerve root.  L4-L5: Interval laminectomy with resolution of previously noted spinal canal stenosis, without residuum. Stable small symmetric disc bulge, superimposed right paracentral disc protrusion and mild facet arthrosis with moderate foraminal, right subarticular recess stenoses and impingement of bilateral exiting L4 and transversing right S1 nerve roots.  L5-S1: Stable mild facet arthrosis, ligamenta flava hypertrophy, moderate disc degeneration, 3 mm retrolisthesis and moderate right asymmetric disc bulge osteophyte complex resulting in mild spinal canal, right subarticular recess, left foraminal, severe right foraminal stenoses and impingement of exiting right L5 and transversing right S1 nerve roots.  IMPRESSION: L4-5 interval laminectomy with resolution of previously noted spinal canal stenosis, without residuum.  Otherwise, stable L3-S1 moderate discs degeneration, facet arthrosis, grade 1 listhesis at L3-L4, L5-S1 and moderate disc bulges resulting in moderate L3-L4 spinal canal, L3-S1 foraminal stenoses and impingement of left L3, bilateral L4 and right L5, S1 nerve roots. See above for detailed description of degenerative changes per levels of the lumbar spine.  Thank you for the opportunity to participate in the care of this patient.     KHADAR OLGUIN MD  - Electronically Signed: 10- 12:25 PM  Physician to Physician Direct Line is:  [de-identified] : EMG lower extremities on 7/17/24

## 2024-07-23 NOTE — HISTORY OF PRESENT ILLNESS
[de-identified] : 53 year old man hx HIV and lumbar stenosis who presented to the office today for second opinion for lumbar spine.  Mr. Cosme states he has suffered from low back pain and sciatica for the past 3 years. He states it has become progressively worse over the past year. He reports severe low back pain radiating to bilateral hips and buttocks down legs to feet with associated numbness/tingling (RIGHT greater than LEFT). He reports subjective weakness of bilateral lower extremities. He has been under the care of pain management for the past 3 years and has had ESIs in the past. At first, he states he had relief from the ESIs for about 4-6 weeks but repeat ESIs done this year have been ineffective in alleviating pain. He has also failed trial of oral pain medications prescribed by pain management including ibuprofen, tramadol and muscle relaxers.  He has participated in supervised physical therapy with no improvement in symptoms.  He had MRI lumbar spine done on 9/15/22, which demonstrates L3-L4 severe left and moderate right foraminal stenosis, L4-5 severe left and right foraminal stenosis and L5-S1 disc herniation.   He underwent L3-4 and L4-5 laminectomy on 12/29/22 with Dr. Byron Contreras.  10/16/23: Returns today due to severe low back pain. He reports occasional radiating RIGHT leg pain. Currently on gabapentin 800 mg tid. Back pain has significantly affected his quality of life. He had to leave his job due to severe pain. Pain is worse with position changes and prolonged standing. MRI lumbar spine was done on 10/12/23 and reviewed by Dr. Contreras. No surgical intervention was recommended. Recommended PT. He returns today to discuss progress.  2/13/24: Returns today due to restless legs. He also describes "feeling bugs crawling" in legs bilaterally as well as numbness/tingling in RIGHT leg.  MRI reviewed which demonstarted no clear structural pathology. Plan was made to obtain EMG and follow up after

## 2024-07-23 NOTE — HISTORY OF PRESENT ILLNESS
[de-identified] : 53 year old man hx HIV and lumbar stenosis who presented to the office today for second opinion for lumbar spine.  Mr. Cosme states he has suffered from low back pain and sciatica for the past 3 years. He states it has become progressively worse over the past year. He reports severe low back pain radiating to bilateral hips and buttocks down legs to feet with associated numbness/tingling (RIGHT greater than LEFT). He reports subjective weakness of bilateral lower extremities. He has been under the care of pain management for the past 3 years and has had ESIs in the past. At first, he states he had relief from the ESIs for about 4-6 weeks but repeat ESIs done this year have been ineffective in alleviating pain. He has also failed trial of oral pain medications prescribed by pain management including ibuprofen, tramadol and muscle relaxers.  He has participated in supervised physical therapy with no improvement in symptoms.  He had MRI lumbar spine done on 9/15/22, which demonstrates L3-L4 severe left and moderate right foraminal stenosis, L4-5 severe left and right foraminal stenosis and L5-S1 disc herniation.   He underwent L3-4 and L4-5 laminectomy on 12/29/22 with Dr. Byron Contreras.  10/16/23: Returns today due to severe low back pain. He reports occasional radiating RIGHT leg pain. Currently on gabapentin 800 mg tid. Back pain has significantly affected his quality of life. He had to leave his job due to severe pain. Pain is worse with position changes and prolonged standing. MRI lumbar spine was done on 10/12/23 and reviewed by Dr. Contreras. No surgical intervention was recommended. Recommended PT. He returns today to discuss progress.  2/13/24: Returns today due to restless legs. He also describes "feeling bugs crawling" in legs bilaterally as well as numbness/tingling in RIGHT leg.  MRI reviewed which demonstarted no clear structural pathology. Plan was made to obtain EMG and follow up after

## 2024-07-23 NOTE — PHYSICAL EXAM
[General Appearance - Alert] : alert [General Appearance - In No Acute Distress] : in no acute distress [General Appearance - Well Nourished] : well nourished [] : normal voice and communication [Oriented To Time, Place, And Person] : oriented to person, place, and time [Impaired Insight] : insight and judgment were intact [Affect] : the affect was normal [Memory Recent] : recent memory was not impaired [Motor Tone] : muscle tone was normal in all four extremities [Abnormal Walk] : normal gait [Limited Balance] : the patient's balance was impaired [FreeTextEntry6] : + Tinnel on R lateral knee right dorsiflexion 4/5

## 2024-08-06 ENCOUNTER — TRANSCRIPTION ENCOUNTER (OUTPATIENT)
Age: 54
End: 2024-08-06

## 2024-08-06 VITALS
HEIGHT: 68 IN | HEART RATE: 67 BPM | SYSTOLIC BLOOD PRESSURE: 108 MMHG | OXYGEN SATURATION: 99 % | RESPIRATION RATE: 16 BRPM | WEIGHT: 146.61 LBS | DIASTOLIC BLOOD PRESSURE: 70 MMHG | TEMPERATURE: 98 F

## 2024-08-06 NOTE — ASU PATIENT PROFILE, ADULT - NSICDXPASTMEDICALHX_GEN_ALL_CORE_FT
PAST MEDICAL HISTORY:  H/O spinal stenosis     HIV disease      PAST MEDICAL HISTORY:  H/O spinal stenosis     HIV disease     Prediabetes not on meds

## 2024-08-06 NOTE — ASU PREOP CHECKLIST - BP NONINVASIVE SYSTOLIC (MM HG)
TONI GARDNER  [address] [place] [address]  [id number]  [phone]      Pharmacy:    [facility] [address] [place] [address] [phone]  [phone]  [id number]  14  [facility] [address]  Phone: [phone] Fax:  [phone]        Emergency Contacts:    Extended Emergency Contact Information  Primary Emergency Contact: Thanh Richter  Address: [address] [place] [address] of [facility]  Home Phone: [phone]  Relation: Spouse  Preferred language: English    Insurance:  Payor: MEDICAID / Plan: [facility] COMMUNITY PLAN [facility] ([place] MEDICAID) / Product Type: Managed Medicaid /       Taylor Blanca RN, CCRN-K, [facility]  Neuro-Critical Care   X [phone]               108

## 2024-08-06 NOTE — ASU PREOP CHECKLIST - WARM FLUIDS/WARM BLANKETS
Ongoing SW/CM Assessment/Plan of Care Note     See SW/CM flowsheets for goals and other objective data.    Patient/Family discharge goal (s):  Goal #1: Communication facilitated  Goal #2: Transportation arranged or issues addressed  Goal #3: Home Care arranged or issues addressed    PT Recommendation:  Recommendation for Discharge: PT: OP therapy, Home    OT Recommendation:  Recommendations for Discharge: OT: Home    Disposition:  Planned Discharge Destination: Home/apartment with Services/Support    Progress note:   Chart reviewed; pt to discharge home today and home care order noted. Met with pt to discuss discharge plans. Pt spoke with Lemuel OCHOA this AM and is agreeable to home therapy and does understand importance of formal therapy needed. List of available home care agencies given to pt; pt identifies [facility] as provider of choice. CM to make referral as requested. Home care reviewed with pt. No questions at this time. Pt to discharge to home later today.    0930 LVM for [facility] intake [phone] regarding referral.     0944 Faxed referral to [facility] [phone].    1112 Received VM from Juany/Lauren [phone] for CM to callback. LVM for Juany at this time.     1315 Spoke with Concetta DALTON, will plan for discharge tomorrow.     1335 Confirmed discharge plans and home care orders with Juany/Lauren. Discharge planned for tomorrow.    no

## 2024-08-06 NOTE — ASU PATIENT PROFILE, ADULT - NSICDXPASTSURGICALHX_GEN_ALL_CORE_FT
PAST SURGICAL HISTORY:  H/O elbow surgery      PAST SURGICAL HISTORY:  H/O elbow surgery right    H/O laminectomy 2022     PAST SURGICAL HISTORY:  H/O elbow surgery right    H/O laminectomy 2022    H/O shoulder surgery

## 2024-08-07 ENCOUNTER — APPOINTMENT (OUTPATIENT)
Dept: NEUROSURGERY | Facility: HOSPITAL | Age: 54
End: 2024-08-07

## 2024-08-07 ENCOUNTER — TRANSCRIPTION ENCOUNTER (OUTPATIENT)
Age: 54
End: 2024-08-07

## 2024-08-07 ENCOUNTER — OUTPATIENT (OUTPATIENT)
Dept: OUTPATIENT SERVICES | Facility: HOSPITAL | Age: 54
LOS: 1 days | Discharge: HOME | End: 2024-08-07
Payer: MEDICARE

## 2024-08-07 VITALS
DIASTOLIC BLOOD PRESSURE: 88 MMHG | RESPIRATION RATE: 15 BRPM | TEMPERATURE: 98 F | SYSTOLIC BLOOD PRESSURE: 129 MMHG | HEART RATE: 63 BPM | OXYGEN SATURATION: 98 %

## 2024-08-07 DIAGNOSIS — Z98.890 OTHER SPECIFIED POSTPROCEDURAL STATES: Chronic | ICD-10-CM

## 2024-08-07 LAB
BLD GP AB SCN SERPL QL: NEGATIVE — SIGNIFICANT CHANGE UP
RH IG SCN BLD-IMP: POSITIVE — SIGNIFICANT CHANGE UP

## 2024-08-07 PROCEDURE — 86901 BLOOD TYPING SEROLOGIC RH(D): CPT

## 2024-08-07 PROCEDURE — 64708 REVISE ARM/LEG NERVE: CPT

## 2024-08-07 PROCEDURE — 64712 REVISION OF SCIATIC NERVE: CPT | Mod: RT

## 2024-08-07 PROCEDURE — 86850 RBC ANTIBODY SCREEN: CPT

## 2024-08-07 PROCEDURE — 86900 BLOOD TYPING SEROLOGIC ABO: CPT

## 2024-08-07 RX ORDER — OXYCODONE HYDROCHLORIDE 30 MG/1
5 TABLET ORAL EVERY 4 HOURS
Refills: 0 | Status: DISCONTINUED | OUTPATIENT
Start: 2024-08-07 | End: 2024-08-07

## 2024-08-07 RX ORDER — ROSUVASTATIN CALCIUM 10 MG
1 TABLET ORAL
Refills: 0 | DISCHARGE

## 2024-08-07 RX ORDER — CHLORHEXIDINE GLUCONATE 500 MG/1
1 CLOTH TOPICAL ONCE
Refills: 0 | Status: COMPLETED | OUTPATIENT
Start: 2024-08-07 | End: 2024-08-07

## 2024-08-07 RX ORDER — POVIDONE-IODINE 0.1 G/ML
1 SOLUTION TOPICAL ONCE
Refills: 0 | Status: COMPLETED | OUTPATIENT
Start: 2024-08-07 | End: 2024-08-07

## 2024-08-07 RX ORDER — MAGNESIUM OXIDE 253 MG/1
1 TABLET ORAL
Refills: 0 | DISCHARGE

## 2024-08-07 RX ORDER — ONDANSETRON HCL/PF 4 MG/2 ML
4 VIAL (ML) INJECTION EVERY 6 HOURS
Refills: 0 | Status: DISCONTINUED | OUTPATIENT
Start: 2024-08-07 | End: 2024-08-07

## 2024-08-07 RX ORDER — OXYCODONE AND ACETAMINOPHEN 10; 325 MG/1; MG/1
1 TABLET ORAL
Qty: 6 | Refills: 0
Start: 2024-08-07 | End: 2024-08-07

## 2024-08-07 RX ORDER — TRAZODONE HCL 100 MG
2 TABLET ORAL
Refills: 0 | DISCHARGE

## 2024-08-07 RX ADMIN — POVIDONE-IODINE 1 APPLICATION(S): 0.1 SOLUTION TOPICAL at 10:49

## 2024-08-07 RX ADMIN — CHLORHEXIDINE GLUCONATE 1 APPLICATION(S): 500 CLOTH TOPICAL at 10:51

## 2024-08-07 NOTE — ASU DISCHARGE PLAN (ADULT/PEDIATRIC) - NS MD DC FALL RISK RISK
For information on Fall & Injury Prevention, visit: [email or web address] OR  [email or web address] OR  https://www.cdc.gov/steadi/patient.html

## 2024-08-07 NOTE — ASU DISCHARGE PLAN (ADULT/PEDIATRIC) - CARE PROVIDER_API CALL
Byron Contreras.  Neurosurgery  [address] [facility] [place] [address]  Phone: [phone]  Fax: [phone]  Established Patient  Follow Up Time: 2 weeks

## 2024-08-07 NOTE — BRIEF OPERATIVE NOTE - OPERATION/FINDINGS
s/p Rt Peroneal nerve decompression  -no issues, <5cc blood loss  -PACU then home on percocet 5mg  -OK to get dressing wet Friday (48h), take dressing off Friday (48hrs)  -f/u in 2wks with Dr. Contreras

## 2024-08-07 NOTE — ASU DISCHARGE PLAN (ADULT/PEDIATRIC) - ASU DC SPECIAL INSTRUCTIONSFT
Do not bath for 2 days. After 2 days can shower. Do not scrub incision, allow water to wash over the incision. Do not submerge in water for 14 days. Do not remove dressing it will fall off. Call the office with any redness, burning, drainage, wound opening, worsening pain, redness, fevers. Take percocet 5mg as needed for incisional pain, can also alternate percocet with tylenol every 4 hours (DO NOT TAKE PERCOCET AND TYLENOL AT THE SAME TIME). Follow-up in the office in 2wks with Dr. Contreras

## 2024-08-12 PROBLEM — R73.03 PREDIABETES: Chronic | Status: ACTIVE | Noted: 2024-08-06

## 2024-08-22 ENCOUNTER — APPOINTMENT (OUTPATIENT)
Dept: NEUROSURGERY | Facility: CLINIC | Age: 54
End: 2024-08-22

## (undated) DEVICE — SPONGE SURGICAL STRIP 1/4 X 6"

## (undated) DEVICE — DRSG MASTISOL

## (undated) DEVICE — PREP CHLORAPREP HI-LITE ORANGE 26ML

## (undated) DEVICE — STAPLER SKIN PROXIMATE

## (undated) DEVICE — MARKING PEN W RULER

## (undated) DEVICE — GLV 6 PROTEXIS (WHITE)

## (undated) DEVICE — CLIPPER BLADE NEURO (BLUE)

## (undated) DEVICE — Device

## (undated) DEVICE — VESSEL LOOP MAXI-BLUE 0.120" X 16"

## (undated) DEVICE — SPONGE SURGICAL STRIP 1/2 X 6"

## (undated) DEVICE — BLADE SCALPEL SAFETY #11 WITH PLASTIC GREEN HANDLE

## (undated) DEVICE — PACK UPPER BODY

## (undated) DEVICE — GLV 5.5 PROTEXIS (WHITE)

## (undated) DEVICE — BIPOLAR FORCEP KOGENT IRRIGATING STRAIGHT 0.5MM X 7" DISP

## (undated) DEVICE — DRAPE SURGICAL #1010

## (undated) DEVICE — DRSG ACE BANDAGE 4"

## (undated) DEVICE — GLV 8.5 PROTEXIS (WHITE)

## (undated) DEVICE — DRSG WEBRIL 4"

## (undated) DEVICE — DRSG GAUZE MOISTURIZER 0.5 OZ 4X8

## (undated) DEVICE — SYR BULB 2OZ (BLUE)

## (undated) DEVICE — GLV 8 PROTEXIS (WHITE)

## (undated) DEVICE — NEPTUNE II 4-PORT MANIFOLD

## (undated) DEVICE — SUT VICRYL 4-0 27" SH UNDYED

## (undated) DEVICE — ELCTR STRYKER NEPTUNE SMOKE EVACUATION PENCIL (GREEN)

## (undated) DEVICE — SUT MONOCRYL 4-0 27" PS-2 UNDYED

## (undated) DEVICE — SUT VICRYL 3-0 18" SH (POP-OFF)

## (undated) DEVICE — DRAPE IOBAN 13" X 13"

## (undated) DEVICE — DRSG TEGADERM 4X4.75"

## (undated) DEVICE — DRSG DERMABOND 0.7ML

## (undated) DEVICE — VENODYNE/SCD SLEEVE CALF MEDIUM

## (undated) DEVICE — SLING ARM CHIEFTAIN MESH LARGE